# Patient Record
Sex: MALE | Race: ASIAN | NOT HISPANIC OR LATINO | Employment: OTHER | URBAN - METROPOLITAN AREA
[De-identification: names, ages, dates, MRNs, and addresses within clinical notes are randomized per-mention and may not be internally consistent; named-entity substitution may affect disease eponyms.]

---

## 2017-01-23 ENCOUNTER — ANTI-COAG VISIT (OUTPATIENT)
Dept: CARDIOLOGY | Facility: CLINIC | Age: 62
End: 2017-01-23
Payer: MEDICAID

## 2017-01-23 DIAGNOSIS — Z79.01 LONG TERM (CURRENT) USE OF ANTICOAGULANTS: Primary | ICD-10-CM

## 2017-01-23 LAB — INR PPP: 2.6 (ref 2–3)

## 2017-01-23 PROCEDURE — 99211 OFF/OP EST MAY X REQ PHY/QHP: CPT | Mod: PBBFAC

## 2017-01-23 PROCEDURE — 85610 PROTHROMBIN TIME: CPT | Mod: PBBFAC

## 2017-01-23 PROCEDURE — 99999 PR PBB SHADOW E&M-EST. PATIENT-LVL I: CPT | Mod: PBBFAC,,,

## 2017-01-23 NOTE — PROGRESS NOTES
INR remains therapeutic.  Patient denies changes to diet, medications, or health that would affect INR.  Patient will continue weekly warfarin regimen at this time.  His INR level is appropriate for his long flight tomorrow.  I reminded him to ambulate every 1 to 2 hours and to wear unrestrictive clothing.  He verbalized understanding.  Patient advised to contact clinic with any changes, questions, or concerns.

## 2017-04-18 ENCOUNTER — ANTI-COAG VISIT (OUTPATIENT)
Dept: CARDIOLOGY | Facility: CLINIC | Age: 62
End: 2017-04-18
Payer: MEDICAID

## 2017-04-18 DIAGNOSIS — Z79.01 LONG TERM (CURRENT) USE OF ANTICOAGULANTS: Primary | ICD-10-CM

## 2017-04-18 LAB — INR PPP: 2.1 (ref 2–3)

## 2017-04-18 PROCEDURE — 85610 PROTHROMBIN TIME: CPT | Mod: PBBFAC | Performed by: PHARMACIST

## 2017-05-16 DIAGNOSIS — Z79.01 LONG TERM CURRENT USE OF ANTICOAGULANT THERAPY: ICD-10-CM

## 2017-05-16 DIAGNOSIS — I82.409 DVT (DEEP VENOUS THROMBOSIS): ICD-10-CM

## 2017-05-16 RX ORDER — WARFARIN SODIUM 5 MG/1
TABLET ORAL
Qty: 90 TABLET | Refills: 0 | Status: SHIPPED | OUTPATIENT
Start: 2017-05-16 | End: 2017-12-06 | Stop reason: SDUPTHER

## 2017-06-13 ENCOUNTER — ANTI-COAG VISIT (OUTPATIENT)
Dept: CARDIOLOGY | Facility: CLINIC | Age: 62
End: 2017-06-13
Payer: MEDICAID

## 2017-06-13 DIAGNOSIS — Z79.01 LONG TERM (CURRENT) USE OF ANTICOAGULANTS: ICD-10-CM

## 2017-06-13 LAB — INR PPP: 3 (ref 2–3)

## 2017-06-13 PROCEDURE — 99999 PR PBB SHADOW E&M-EST. PATIENT-LVL I: CPT | Mod: PBBFAC,,,

## 2017-06-13 PROCEDURE — 99211 OFF/OP EST MAY X REQ PHY/QHP: CPT | Mod: PBBFAC

## 2017-06-13 PROCEDURE — 85610 PROTHROMBIN TIME: CPT | Mod: PBBFAC

## 2017-06-13 NOTE — PROGRESS NOTES
INR is good. Patient reports using allegra for allergies lately. No other changes reported. No signs or symptoms of bleeding. We will continue on maintenance dose. Repeat INR in another 8 weeks. Patient reminded he needs to see [facility] PCP for annual exam. Patient reports he will call to make appointment.

## 2017-07-28 DIAGNOSIS — Z12.11 COLON CANCER SCREENING: ICD-10-CM

## 2017-08-08 ENCOUNTER — ANTI-COAG VISIT (OUTPATIENT)
Dept: CARDIOLOGY | Facility: CLINIC | Age: 62
End: 2017-08-08
Payer: MEDICAID

## 2017-08-08 DIAGNOSIS — Z79.01 LONG TERM (CURRENT) USE OF ANTICOAGULANTS: ICD-10-CM

## 2017-08-08 LAB — INR PPP: 1.7 (ref 2–3)

## 2017-08-08 PROCEDURE — 85610 PROTHROMBIN TIME: CPT | Mod: PBBFAC

## 2017-08-08 NOTE — PROGRESS NOTES
Patient confirms dose. States 1 missed dose 8/2. No new medications or changes in diet. Will boost. I have reviewed the student's initial findings and agree with their assessment. I have personally spoken with and assessed the patient in clinic to devise care plan.

## 2017-10-04 ENCOUNTER — ANTI-COAG VISIT (OUTPATIENT)
Dept: CARDIOLOGY | Facility: CLINIC | Age: 62
End: 2017-10-04
Payer: MEDICAID

## 2017-10-04 DIAGNOSIS — Z79.01 LONG-TERM (CURRENT) USE OF ANTICOAGULANTS: Primary | ICD-10-CM

## 2017-10-04 LAB — INR PPP: 2 (ref 2–3)

## 2017-10-04 PROCEDURE — 85610 PROTHROMBIN TIME: CPT | Mod: PBBFAC

## 2017-10-04 NOTE — PROGRESS NOTES
Patient reports no changes to alter INR. Will maintain dose.  Advised to call [facility]in Clinic with any issues.  I have reviewed the student's initial findings and agree with their assessment. I have personally spoken with and assessed the patient in clinic to devise care plan.

## 2017-10-16 ENCOUNTER — TELEPHONE (OUTPATIENT)
Dept: INTERNAL MEDICINE | Facility: CLINIC | Age: 62
End: 2017-10-16

## 2017-10-16 NOTE — TELEPHONE ENCOUNTER
----- Message from Muna Banegas sent at 10/16/2017  3:22 PM CDT -----  Contact: self  Pt called in about wanting to schedule appt. Pt wants a physical.Pt would like the nurse to give him a call back in regards to this matter        Pt can be reached at [phone]      Thank You

## 2017-12-06 ENCOUNTER — ANTI-COAG VISIT (OUTPATIENT)
Dept: CARDIOLOGY | Facility: CLINIC | Age: 62
End: 2017-12-06
Payer: MEDICAID

## 2017-12-06 DIAGNOSIS — Z79.01 LONG-TERM (CURRENT) USE OF ANTICOAGULANTS: ICD-10-CM

## 2017-12-06 DIAGNOSIS — Z79.01 LONG TERM CURRENT USE OF ANTICOAGULANT THERAPY: Primary | ICD-10-CM

## 2017-12-06 LAB — INR PPP: 1.9 (ref 2–3)

## 2017-12-06 PROCEDURE — 85610 PROTHROMBIN TIME: CPT | Mod: PBBFAC

## 2017-12-06 PROCEDURE — 99999 PR PBB SHADOW E&M-EST. PATIENT-LVL I: CPT | Mod: PBBFAC,,,

## 2017-12-06 PROCEDURE — 99211 OFF/OP EST MAY X REQ PHY/QHP: CPT | Mod: PBBFAC

## 2017-12-06 RX ORDER — WARFARIN SODIUM 5 MG/1
TABLET ORAL
Qty: 56 TABLET | Refills: 3 | Status: SHIPPED | OUTPATIENT
Start: 2017-12-06 | End: 2018-03-20 | Stop reason: SDUPTHER

## 2017-12-06 NOTE — PROGRESS NOTES
INR slightly low today. Patient states that he drank an increased amount of green tea this past week and will not be continuing that. With this change will boost dose today and re challenge previously stable weekly dose until follow-up in 6 weeks. Advised him to call with any changes or concerns.

## 2017-12-06 NOTE — PROGRESS NOTES
Pt seen by Allie HANLEY. I have reviewed her initial findings and agree with her assessment and plan.

## 2017-12-20 ENCOUNTER — TELEPHONE (OUTPATIENT)
Dept: INTERNAL MEDICINE | Facility: CLINIC | Age: 62
End: 2017-12-20

## 2017-12-20 ENCOUNTER — NURSE TRIAGE (OUTPATIENT)
Dept: ADMINISTRATIVE | Facility: CLINIC | Age: 62
End: 2017-12-20

## 2017-12-20 NOTE — TELEPHONE ENCOUNTER
Reason for Disposition   Abdominal pain is a chronic symptom (recurrent or ongoing AND lasting > 4 weeks)    Protocols used: ST ABDOMINAL PAIN - MALE-A-[place]    Pt states he called Dr Queen office last month for apt with Dr Queen and no one was able to help him. Pt states he has abd pain with no other symptoms. Pt transferred to me by someone in Dr Queen's office. Pt states he only is calling for apt. I informed pt that I do not see any apts available next week but would send a message for someone in the office to call him back. Pt got upset stating that he already spoke to someone in office and was sent to me. Attempted to transfer pt back to Dr Queen's office per pt request. Pt got upset and states he will find a new doctor.

## 2017-12-20 NOTE — TELEPHONE ENCOUNTER
----- Message from Ayad Rueda sent at 12/20/2017 12:30 PM CST -----  Contact: Pt [phone]  Pt is requesting an appt. asap due to stomach pain and blood clot in left leg. Would like a call today.    Pt can be reached at [phone]    Thanks

## 2018-01-17 NOTE — PROGRESS NOTES
Patient called to reschedule today's coumadin clinic appointment due to the bad weather, appointment was rescheduled to Friday -1/19

## 2018-01-19 ENCOUNTER — ANTI-COAG VISIT (OUTPATIENT)
Dept: CARDIOLOGY | Facility: CLINIC | Age: 63
End: 2018-01-19
Payer: MEDICAID

## 2018-01-19 DIAGNOSIS — Z79.01 LONG TERM (CURRENT) USE OF ANTICOAGULANTS: Primary | ICD-10-CM

## 2018-01-19 LAB — INR PPP: 2.4 (ref 2–3)

## 2018-01-19 PROCEDURE — 85610 PROTHROMBIN TIME: CPT | Mod: PBBFAC

## 2018-01-19 PROCEDURE — 99211 OFF/OP EST MAY X REQ PHY/QHP: CPT | Mod: S$PBB,25,,

## 2018-01-19 NOTE — PROGRESS NOTES
INR within normal range today. Patient states he has started a new medicine, atorvastatin, no DDI expected. Denies any bleeding, bruising or other changes. Patient is stable on this dose. He will continue this dose until follow-up in 7 weeks. I advised him to contact us with any changes or problems.

## 2018-03-20 ENCOUNTER — ANTI-COAG VISIT (OUTPATIENT)
Dept: CARDIOLOGY | Facility: CLINIC | Age: 63
End: 2018-03-20
Payer: MEDICAID

## 2018-03-20 DIAGNOSIS — Z79.01 LONG TERM CURRENT USE OF ANTICOAGULANT THERAPY: ICD-10-CM

## 2018-03-20 DIAGNOSIS — Z79.01 LONG TERM (CURRENT) USE OF ANTICOAGULANTS: Primary | ICD-10-CM

## 2018-03-20 LAB — INR PPP: 2.9 (ref 2–3)

## 2018-03-20 PROCEDURE — 85610 PROTHROMBIN TIME: CPT | Mod: PBBFAC

## 2018-03-20 RX ORDER — WARFARIN SODIUM 5 MG/1
TABLET ORAL
Qty: 60 TABLET | Refills: 3 | Status: SHIPPED | OUTPATIENT
Start: 2018-03-20 | End: 2019-06-04 | Stop reason: SDUPTHER

## 2018-05-01 ENCOUNTER — ANTI-COAG VISIT (OUTPATIENT)
Dept: CARDIOLOGY | Facility: CLINIC | Age: 63
End: 2018-05-01
Payer: MEDICAID

## 2018-05-01 DIAGNOSIS — Z79.01 LONG TERM (CURRENT) USE OF ANTICOAGULANTS: Primary | ICD-10-CM

## 2018-05-01 LAB — INR PPP: 2.5 (ref 2–3)

## 2018-05-01 PROCEDURE — 85610 PROTHROMBIN TIME: CPT | Mod: PBBFAC

## 2018-05-01 NOTE — PROGRESS NOTES
Patient seen by Angella HANLEY. I have reviewed her initial findings and agree with her assessment.  Care plan made together.

## 2018-06-12 ENCOUNTER — ANTI-COAG VISIT (OUTPATIENT)
Dept: CARDIOLOGY | Facility: CLINIC | Age: 63
End: 2018-06-12
Payer: MEDICAID

## 2018-06-12 DIAGNOSIS — Z79.01 LONG TERM (CURRENT) USE OF ANTICOAGULANTS: Primary | ICD-10-CM

## 2018-06-12 LAB — INR PPP: 2.4 (ref 2–3)

## 2018-06-12 PROCEDURE — 85610 PROTHROMBIN TIME: CPT | Mod: PBBFAC

## 2018-08-21 ENCOUNTER — ANTI-COAG VISIT (OUTPATIENT)
Dept: CARDIOLOGY | Facility: CLINIC | Age: 63
End: 2018-08-21
Payer: MEDICAID

## 2018-08-21 DIAGNOSIS — Z79.01 LONG TERM (CURRENT) USE OF ANTICOAGULANTS: Primary | ICD-10-CM

## 2018-08-21 LAB — INR PPP: 3.5 (ref 2–3)

## 2018-08-21 PROCEDURE — 85610 PROTHROMBIN TIME: CPT | Mod: PBBFAC

## 2018-08-24 NOTE — PROGRESS NOTES
INR elevated today.  Patient does report missing dose on 8/14 as well as stress of driving a long way to go on vacation.  Will hold today then resume with follow up in 2 weeks.  Instructed patient on importance of taking Coumadin as prescribed as not miss a dose which he verbalized understanding.  Bruising from use but denies bleeding or other issues.  KING Wagner D assisted with dosing

## 2018-09-07 ENCOUNTER — ANTI-COAG VISIT (OUTPATIENT)
Dept: CARDIOLOGY | Facility: CLINIC | Age: 63
End: 2018-09-07
Payer: MEDICAID

## 2018-09-07 DIAGNOSIS — Z79.01 LONG TERM (CURRENT) USE OF ANTICOAGULANTS: Primary | ICD-10-CM

## 2018-09-07 LAB — INR PPP: 3.3 (ref 2–3)

## 2018-09-07 PROCEDURE — 85610 PROTHROMBIN TIME: CPT | Mod: PBBFAC

## 2018-09-07 NOTE — PROGRESS NOTES
Quick follow up for elevated INR on 8/21. INR elevated today but improving. Patient denies any bleeding, bruising or other changes. Patient requested his we resume his dose of 2.5mg daily. We will decrease his current dose. Patient states that he can not come back for 6 weeks. Patient reminded of the importance of frequent monitoring of INR when out of range. He states that he understands.  Patient reminded to call clinic with any changes or concerns. Care plan made with M Colosino Pharm D.

## 2018-10-18 ENCOUNTER — ANTI-COAG VISIT (OUTPATIENT)
Dept: CARDIOLOGY | Facility: CLINIC | Age: 63
End: 2018-10-18
Payer: MEDICAID

## 2018-10-18 DIAGNOSIS — Z79.01 LONG TERM (CURRENT) USE OF ANTICOAGULANTS: Primary | ICD-10-CM

## 2018-10-18 LAB — INR PPP: 2.9 (ref 2–3)

## 2018-10-18 PROCEDURE — 85610 PROTHROMBIN TIME: CPT | Mod: PBBFAC

## 2018-10-18 PROCEDURE — 99211 OFF/OP EST MAY X REQ PHY/QHP: CPT | Mod: S$PBB,25,,

## 2018-10-18 NOTE — PROGRESS NOTES
INR within therapeutic range today. Bruising noted to arms from use. Patient denies any bleeding or other changes that would affect warfarin therapy. Will maintain current dose and follow up in 6 weeks . Patient advised to call coumadin clinic with any changes or concerns. Care plan made with NIKKI OAKES

## 2018-12-06 ENCOUNTER — ANTI-COAG VISIT (OUTPATIENT)
Dept: CARDIOLOGY | Facility: CLINIC | Age: 63
End: 2018-12-06
Payer: MEDICAID

## 2018-12-06 DIAGNOSIS — Z79.01 LONG TERM (CURRENT) USE OF ANTICOAGULANTS: Primary | ICD-10-CM

## 2018-12-06 LAB — INR PPP: 1.5 (ref 2–3)

## 2018-12-06 PROCEDURE — 85610 PROTHROMBIN TIME: CPT | Mod: PBBFAC

## 2018-12-06 NOTE — PROGRESS NOTES
INR subtherapeutic today. He states he may have had a little extra greens. Patient denies any bleeding or changes that may affect warfarin therapy. We will boost his dose X 2 days then resume. Follow up in 3 weeks. Patient reminded to call clinic with any changes or concerns. Care plan made with NIKKI OAKES

## 2019-01-09 ENCOUNTER — ANTI-COAG VISIT (OUTPATIENT)
Dept: CARDIOLOGY | Facility: CLINIC | Age: 64
End: 2019-01-09
Payer: MEDICAID

## 2019-01-09 DIAGNOSIS — Z79.01 LONG TERM (CURRENT) USE OF ANTICOAGULANTS: Primary | ICD-10-CM

## 2019-01-09 LAB — INR PPP: 1.9 (ref 2–3)

## 2019-01-09 PROCEDURE — 85610 PROTHROMBIN TIME: CPT | Mod: PBBFAC

## 2019-01-09 NOTE — PROGRESS NOTES
Quick follow up for subtherapeutic INR on  11/6. INR subtherapeutic today. Patient states he did have extra greens. He denies any bleeding, bruising or other changes. We will boost his dose today then resume. [place] and unable to have a PT/INR checked until 3/7/19. Patient advised to call clinic with any changes or concerns Care plan made with NIKKI PAGE

## 2019-03-19 ENCOUNTER — ANTI-COAG VISIT (OUTPATIENT)
Dept: CARDIOLOGY | Facility: CLINIC | Age: 64
End: 2019-03-19
Payer: MEDICAID

## 2019-03-19 DIAGNOSIS — Z79.01 LONG TERM (CURRENT) USE OF ANTICOAGULANTS: Primary | ICD-10-CM

## 2019-03-19 LAB — INR PPP: 3 (ref 2–3)

## 2019-03-19 PROCEDURE — 85610 PROTHROMBIN TIME: CPT | Mod: PBBFAC

## 2019-03-19 NOTE — PROGRESS NOTES
Pt seen by Aurora HANLEY. I have reviewed her initial findings and agree with her assessment and plan

## 2019-03-19 NOTE — PROGRESS NOTES
Quick follow up for subtherapeutic INR on 1/9. INR within therapeutic range today. Bruising noted to arms from use. Patient denies any bleeding or other changes that would affect warfarin therapy. Will maintain current dose and follow up in 8 weeks. Patient was re-educated on situations that would require placing a call to the Coumadin  Clinic, including bleeding or unusual bruising issues, changes in health, diet or medications,upcoming procedures that require warfarin interruption, and missed Coumadin dose(s). Patient expressed understanding that avoidance of consistency with these parameters could cause fluctuations in INR, leading to more frequent visits and increase risk of adverse events. Care plan made with NIKKI OAKES

## 2019-06-04 ENCOUNTER — ANTI-COAG VISIT (OUTPATIENT)
Dept: CARDIOLOGY | Facility: CLINIC | Age: 64
End: 2019-06-04
Payer: MEDICAID

## 2019-06-04 DIAGNOSIS — Z79.01 LONG TERM CURRENT USE OF ANTICOAGULANT THERAPY: ICD-10-CM

## 2019-06-04 DIAGNOSIS — Z79.01 LONG TERM (CURRENT) USE OF ANTICOAGULANTS: Primary | ICD-10-CM

## 2019-06-04 LAB — INR PPP: 2.2 (ref 2–3)

## 2019-06-04 PROCEDURE — 85610 PROTHROMBIN TIME: CPT | Mod: PBBFAC

## 2019-06-04 RX ORDER — WARFARIN SODIUM 5 MG/1
TABLET ORAL
Qty: 60 TABLET | Refills: 3 | Status: SHIPPED | OUTPATIENT
Start: 2019-06-04 | End: 2019-09-30 | Stop reason: SDUPTHER

## 2019-06-04 RX ORDER — WARFARIN SODIUM 5 MG/1
TABLET ORAL
Qty: 60 TABLET | Refills: 3 | Status: SHIPPED | OUTPATIENT
Start: 2019-06-04 | End: 2019-06-04

## 2019-08-28 NOTE — PROGRESS NOTES
Spoke to Ms. Ramírez and she stated that the pt is out of town and will be back on 8/30/19. Advised her I will call the patient net week to get him rescheduled for his missed appt.

## 2019-09-16 NOTE — PROGRESS NOTES
"Unable to reach patient. Phone call states, "Sorry, your call cannot be completed at this time". Will send out missed letter today.   "

## 2019-09-30 ENCOUNTER — ANTI-COAG VISIT (OUTPATIENT)
Dept: CARDIOLOGY | Facility: CLINIC | Age: 64
End: 2019-09-30
Payer: MEDICAID

## 2019-09-30 DIAGNOSIS — Z79.01 LONG TERM CURRENT USE OF ANTICOAGULANT THERAPY: ICD-10-CM

## 2019-09-30 DIAGNOSIS — I82.5Y2: ICD-10-CM

## 2019-09-30 DIAGNOSIS — Z79.01 LONG TERM (CURRENT) USE OF ANTICOAGULANTS: Primary | ICD-10-CM

## 2019-09-30 LAB — INR PPP: 2.3 (ref 2–3)

## 2019-09-30 PROCEDURE — 93793 ANTICOAG MGMT PT WARFARIN: CPT | Mod: ,,,

## 2019-09-30 PROCEDURE — 85610 PROTHROMBIN TIME: CPT | Mod: PBBFAC

## 2019-09-30 PROCEDURE — 93793 PR ANTICOAGULANT MGMT FOR PT TAKING WARFARIN: ICD-10-PCS | Mod: ,,,

## 2019-09-30 RX ORDER — WARFARIN SODIUM 5 MG/1
TABLET ORAL
Qty: 30 TABLET | Refills: 1 | Status: SHIPPED | OUTPATIENT
Start: 2019-09-30 | End: 2019-11-12 | Stop reason: SDUPTHER

## 2019-09-30 NOTE — PROGRESS NOTES
INR at goal. Medications and chart reviewed. No changes noted to necessitate adjustment of warfarin or follow-up plan. See calendar.  Pt denies any changes.  Maintain current regimen & re-assess in 6 weeks. New prescription sent to preferred pharmacy.   Patient was re-educated on situations that would require placing a call to the Coumadin Clinic, including bleeding or unusual bruising issues, changes in health, diet or medications,upcoming procedures that require warfarin interruption, and missed Coumadin dose(s). Patient expressed understanding that avoidance of consistency with these parameters could cause fluctuations in INR, leading to more frequent visits and increase risk of adverse events.

## 2019-11-12 ENCOUNTER — ANTI-COAG VISIT (OUTPATIENT)
Dept: CARDIOLOGY | Facility: CLINIC | Age: 64
End: 2019-11-12
Payer: MEDICAID

## 2019-11-12 DIAGNOSIS — Z79.01 LONG TERM (CURRENT) USE OF ANTICOAGULANTS: Primary | ICD-10-CM

## 2019-11-12 DIAGNOSIS — I82.5Y2: ICD-10-CM

## 2019-11-12 DIAGNOSIS — Z79.01 LONG TERM CURRENT USE OF ANTICOAGULANT THERAPY: ICD-10-CM

## 2019-11-12 LAB — INR PPP: 2.3 (ref 2–3)

## 2019-11-12 PROCEDURE — 93793 ANTICOAG MGMT PT WARFARIN: CPT | Mod: ,,,

## 2019-11-12 PROCEDURE — 93793 PR ANTICOAGULANT MGMT FOR PT TAKING WARFARIN: ICD-10-PCS | Mod: ,,,

## 2019-11-12 PROCEDURE — 85610 PROTHROMBIN TIME: CPT | Mod: PBBFAC

## 2019-11-12 RX ORDER — WARFARIN SODIUM 5 MG/1
TABLET ORAL
Qty: 30 TABLET | Refills: 2 | Status: SHIPPED | OUTPATIENT
Start: 2019-11-12 | End: 2021-05-13 | Stop reason: ALTCHOICE

## 2019-11-12 NOTE — PROGRESS NOTES
INR at goal. Medications and chart reviewed. No changes noted to necessitate adjustment of warfarin or follow-up plan. See calendar.  Pt denies any changes.  Will maintain current regimen & re-assess in 8 weeks.  Rx sent to preferred pharmacy.   Patient was re-educated on situations that would require placing a call to the Coumadin Clinic, including bleeding or unusual bruising issues, changes in health, diet or medications,upcoming procedures that require warfarin interruption, and missed Coumadin dose(s). Patient expressed understanding that avoidance of consistency with these parameters could cause fluctuations in INR, leading to more frequent visits and increase risk of adverse events.

## 2020-01-14 ENCOUNTER — ANTI-COAG VISIT (OUTPATIENT)
Dept: CARDIOLOGY | Facility: CLINIC | Age: 65
End: 2020-01-14
Payer: MEDICAID

## 2020-01-14 DIAGNOSIS — Z79.01 LONG TERM (CURRENT) USE OF ANTICOAGULANTS: Primary | ICD-10-CM

## 2020-01-14 LAB — INR PPP: 2.7 (ref 2–3)

## 2020-01-14 PROCEDURE — 85610 PROTHROMBIN TIME: CPT | Mod: PBBFAC

## 2020-01-14 PROCEDURE — 93793 ANTICOAG MGMT PT WARFARIN: CPT | Mod: ,,, | Performed by: PHARMACIST

## 2020-01-14 PROCEDURE — 93793 PR ANTICOAGULANT MGMT FOR PT TAKING WARFARIN: ICD-10-PCS | Mod: ,,, | Performed by: PHARMACIST

## 2020-01-14 NOTE — PROGRESS NOTES
INR at goal. Reports no changes in diet, medications, or health that would affect his INR. Plan to continue dose per calendar and follow up in 8 weeks. Patient was re-educated on situations that would require placing a call to the coumadin clinic, including bleeding or unusual bruising issues, changes in health, diet or medications, upcoming procedures that require warfarin interruption, and missed coumadin dose(s). Patient expressed understanding that avoidance of consistency with these parameters could cause fluctuations in INR, leading to more frequent visits and increase risk of adverse events.

## 2020-04-10 NOTE — PROGRESS NOTES
4/10 - last scheduled INR missed. Levels stable but now almost 12 weeks since last drawn (longest recommended interval per CHEST). Recommend repeat INR asap.

## 2020-05-28 ENCOUNTER — LAB VISIT (OUTPATIENT)
Dept: LAB | Facility: HOSPITAL | Age: 65
End: 2020-05-28
Attending: INTERNAL MEDICINE
Payer: MEDICAID

## 2020-05-28 DIAGNOSIS — Z79.01 LONG TERM (CURRENT) USE OF ANTICOAGULANTS: ICD-10-CM

## 2020-05-28 LAB
INR PPP: 2.8 (ref 0.8–1.2)
PROTHROMBIN TIME: 26.3 SEC (ref 9–12.5)

## 2020-05-28 PROCEDURE — 36415 COLL VENOUS BLD VENIPUNCTURE: CPT | Mod: PO

## 2020-05-28 PROCEDURE — 85610 PROTHROMBIN TIME: CPT

## 2020-05-29 ENCOUNTER — ANTI-COAG VISIT (OUTPATIENT)
Dept: CARDIOLOGY | Facility: CLINIC | Age: 65
End: 2020-05-29
Payer: MEDICAID

## 2020-05-29 DIAGNOSIS — Z79.01 LONG TERM (CURRENT) USE OF ANTICOAGULANTS: ICD-10-CM

## 2020-05-29 PROCEDURE — 93793 ANTICOAG MGMT PT WARFARIN: CPT | Mod: ,,, | Performed by: PHARMACIST

## 2020-05-29 PROCEDURE — 93793 PR ANTICOAGULANT MGMT FOR PT TAKING WARFARIN: ICD-10-PCS | Mod: ,,, | Performed by: PHARMACIST

## 2021-05-11 ENCOUNTER — LAB VISIT (OUTPATIENT)
Dept: LAB | Facility: HOSPITAL | Age: 66
End: 2021-05-11
Attending: INTERNAL MEDICINE
Payer: MEDICAID

## 2021-05-11 DIAGNOSIS — Z79.01 LONG TERM (CURRENT) USE OF ANTICOAGULANTS: ICD-10-CM

## 2021-05-11 LAB
INR PPP: 1.7 (ref 0.8–1.2)
PROTHROMBIN TIME: 17.9 SEC (ref 9–12.5)

## 2021-05-11 PROCEDURE — 36415 COLL VENOUS BLD VENIPUNCTURE: CPT | Mod: PO | Performed by: INTERNAL MEDICINE

## 2021-05-11 PROCEDURE — 85610 PROTHROMBIN TIME: CPT | Performed by: INTERNAL MEDICINE

## 2021-05-12 ENCOUNTER — ANTI-COAG VISIT (OUTPATIENT)
Dept: CARDIOLOGY | Facility: CLINIC | Age: 66
End: 2021-05-12
Payer: MEDICAID

## 2021-05-12 DIAGNOSIS — Z79.01 LONG TERM (CURRENT) USE OF ANTICOAGULANTS: Primary | ICD-10-CM

## 2021-05-12 PROCEDURE — 93793 PR ANTICOAGULANT MGMT FOR PT TAKING WARFARIN: ICD-10-PCS | Mod: ,,, | Performed by: PHARMACIST

## 2021-05-12 PROCEDURE — 93793 ANTICOAG MGMT PT WARFARIN: CPT | Mod: ,,, | Performed by: PHARMACIST

## 2021-05-13 ENCOUNTER — OFFICE VISIT (OUTPATIENT)
Dept: PRIMARY CARE CLINIC | Facility: CLINIC | Age: 66
End: 2021-05-13
Payer: MEDICAID

## 2021-05-13 ENCOUNTER — LAB VISIT (OUTPATIENT)
Dept: PRIMARY CARE CLINIC | Facility: CLINIC | Age: 66
End: 2021-05-13
Payer: MEDICAID

## 2021-05-13 VITALS
BODY MASS INDEX: 22.77 KG/M2 | HEIGHT: 68 IN | WEIGHT: 150.25 LBS | SYSTOLIC BLOOD PRESSURE: 126 MMHG | OXYGEN SATURATION: 98 % | DIASTOLIC BLOOD PRESSURE: 82 MMHG | HEART RATE: 61 BPM

## 2021-05-13 DIAGNOSIS — E78.5 HYPERLIPIDEMIA, UNSPECIFIED HYPERLIPIDEMIA TYPE: ICD-10-CM

## 2021-05-13 DIAGNOSIS — Z86.718 HISTORY OF DVT (DEEP VEIN THROMBOSIS): ICD-10-CM

## 2021-05-13 DIAGNOSIS — R74.01 TRANSAMINITIS: ICD-10-CM

## 2021-05-13 DIAGNOSIS — I10 ESSENTIAL HYPERTENSION: ICD-10-CM

## 2021-05-13 DIAGNOSIS — Z00.00 ANNUAL PHYSICAL EXAM: ICD-10-CM

## 2021-05-13 DIAGNOSIS — Z00.00 ANNUAL PHYSICAL EXAM: Primary | ICD-10-CM

## 2021-05-13 LAB
ALBUMIN SERPL BCP-MCNC: 4.1 G/DL (ref 3.5–5.2)
ALP SERPL-CCNC: 74 U/L (ref 55–135)
ALT SERPL W/O P-5'-P-CCNC: 50 U/L (ref 10–44)
ANION GAP SERPL CALC-SCNC: 8 MMOL/L (ref 8–16)
AST SERPL-CCNC: 29 U/L (ref 10–40)
BASOPHILS # BLD AUTO: 0.06 K/UL (ref 0–0.2)
BASOPHILS NFR BLD: 0.8 % (ref 0–1.9)
BILIRUB SERPL-MCNC: 0.8 MG/DL (ref 0.1–1)
BUN SERPL-MCNC: 13 MG/DL (ref 8–23)
CALCIUM SERPL-MCNC: 8.8 MG/DL (ref 8.7–10.5)
CHLORIDE SERPL-SCNC: 105 MMOL/L (ref 95–110)
CHOLEST SERPL-MCNC: 144 MG/DL (ref 120–199)
CHOLEST/HDLC SERPL: 3.5 {RATIO} (ref 2–5)
CO2 SERPL-SCNC: 28 MMOL/L (ref 23–29)
CREAT SERPL-MCNC: 1 MG/DL (ref 0.5–1.4)
DIFFERENTIAL METHOD: NORMAL
EOSINOPHIL # BLD AUTO: 0.3 K/UL (ref 0–0.5)
EOSINOPHIL NFR BLD: 3.9 % (ref 0–8)
ERYTHROCYTE [DISTWIDTH] IN BLOOD BY AUTOMATED COUNT: 12.7 % (ref 11.5–14.5)
EST. GFR  (AFRICAN AMERICAN): >60 ML/MIN/1.73 M^2
EST. GFR  (NON AFRICAN AMERICAN): >60 ML/MIN/1.73 M^2
ESTIMATED AVG GLUCOSE: 117 MG/DL (ref 68–131)
GLUCOSE SERPL-MCNC: 91 MG/DL (ref 70–110)
HBA1C MFR BLD: 5.7 % (ref 4–5.6)
HCT VFR BLD AUTO: 47.2 % (ref 40–54)
HDLC SERPL-MCNC: 41 MG/DL (ref 40–75)
HDLC SERPL: 28.5 % (ref 20–50)
HGB BLD-MCNC: 15.6 G/DL (ref 14–18)
IMM GRANULOCYTES # BLD AUTO: 0.03 K/UL (ref 0–0.04)
IMM GRANULOCYTES NFR BLD AUTO: 0.4 % (ref 0–0.5)
LDLC SERPL CALC-MCNC: 67.8 MG/DL (ref 63–159)
LYMPHOCYTES # BLD AUTO: 2.3 K/UL (ref 1–4.8)
LYMPHOCYTES NFR BLD: 32.4 % (ref 18–48)
MCH RBC QN AUTO: 29.6 PG (ref 27–31)
MCHC RBC AUTO-ENTMCNC: 33.1 G/DL (ref 32–36)
MCV RBC AUTO: 90 FL (ref 82–98)
MONOCYTES # BLD AUTO: 0.5 K/UL (ref 0.3–1)
MONOCYTES NFR BLD: 6.2 % (ref 4–15)
NEUTROPHILS # BLD AUTO: 4.1 K/UL (ref 1.8–7.7)
NEUTROPHILS NFR BLD: 56.3 % (ref 38–73)
NONHDLC SERPL-MCNC: 103 MG/DL
NRBC BLD-RTO: 0 /100 WBC
PLATELET # BLD AUTO: 250 K/UL (ref 150–450)
PMV BLD AUTO: 10.6 FL (ref 9.2–12.9)
POTASSIUM SERPL-SCNC: 4.2 MMOL/L (ref 3.5–5.1)
PROT SERPL-MCNC: 7.3 G/DL (ref 6–8.4)
RBC # BLD AUTO: 5.27 M/UL (ref 4.6–6.2)
SODIUM SERPL-SCNC: 141 MMOL/L (ref 136–145)
TRIGL SERPL-MCNC: 176 MG/DL (ref 30–150)
WBC # BLD AUTO: 7.23 K/UL (ref 3.9–12.7)

## 2021-05-13 PROCEDURE — 99204 OFFICE O/P NEW MOD 45 MIN: CPT | Mod: S$PBB,,, | Performed by: FAMILY MEDICINE

## 2021-05-13 PROCEDURE — 99214 OFFICE O/P EST MOD 30 MIN: CPT | Mod: PBBFAC,PN | Performed by: FAMILY MEDICINE

## 2021-05-13 PROCEDURE — 99204 PR OFFICE/OUTPT VISIT, NEW, LEVL IV, 45-59 MIN: ICD-10-PCS | Mod: S$PBB,,, | Performed by: FAMILY MEDICINE

## 2021-05-13 PROCEDURE — 99999 PR PBB SHADOW E&M-EST. PATIENT-LVL IV: CPT | Mod: PBBFAC,,, | Performed by: FAMILY MEDICINE

## 2021-05-13 PROCEDURE — 36415 COLL VENOUS BLD VENIPUNCTURE: CPT | Performed by: FAMILY MEDICINE

## 2021-05-13 PROCEDURE — 36415 COLL VENOUS BLD VENIPUNCTURE: CPT | Mod: PBBFAC,PN | Performed by: FAMILY MEDICINE

## 2021-05-13 PROCEDURE — 99999 PR PBB SHADOW E&M-EST. PATIENT-LVL IV: ICD-10-PCS | Mod: PBBFAC,,, | Performed by: FAMILY MEDICINE

## 2021-05-13 PROCEDURE — 80061 LIPID PANEL: CPT | Performed by: FAMILY MEDICINE

## 2021-05-13 PROCEDURE — 80053 COMPREHEN METABOLIC PANEL: CPT | Performed by: FAMILY MEDICINE

## 2021-05-13 PROCEDURE — 85025 COMPLETE CBC W/AUTO DIFF WBC: CPT | Performed by: FAMILY MEDICINE

## 2021-05-13 PROCEDURE — 83036 HEMOGLOBIN GLYCOSYLATED A1C: CPT | Performed by: FAMILY MEDICINE

## 2021-05-13 RX ORDER — TAMSULOSIN HYDROCHLORIDE 0.4 MG/1
CAPSULE ORAL
COMMUNITY
Start: 2021-03-15 | End: 2022-07-20 | Stop reason: SDUPTHER

## 2021-05-13 RX ORDER — LOSARTAN POTASSIUM 25 MG/1
TABLET ORAL
COMMUNITY
Start: 2021-03-15 | End: 2021-07-22

## 2021-05-13 RX ORDER — ATORVASTATIN CALCIUM 20 MG/1
TABLET, FILM COATED ORAL
COMMUNITY
Start: 2021-03-17 | End: 2021-07-22

## 2021-07-01 ENCOUNTER — LAB VISIT (OUTPATIENT)
Dept: PRIMARY CARE CLINIC | Facility: CLINIC | Age: 66
End: 2021-07-01
Payer: MEDICAID

## 2021-07-01 DIAGNOSIS — Z79.01 LONG TERM (CURRENT) USE OF ANTICOAGULANTS: ICD-10-CM

## 2021-07-01 PROCEDURE — 85610 PROTHROMBIN TIME: CPT | Performed by: INTERNAL MEDICINE

## 2021-07-01 PROCEDURE — 36415 COLL VENOUS BLD VENIPUNCTURE: CPT | Mod: PBBFAC,PN

## 2021-07-02 ENCOUNTER — ANTI-COAG VISIT (OUTPATIENT)
Dept: CARDIOLOGY | Facility: CLINIC | Age: 66
End: 2021-07-02

## 2021-07-02 DIAGNOSIS — Z79.01 LONG TERM (CURRENT) USE OF ANTICOAGULANTS: ICD-10-CM

## 2021-07-02 DIAGNOSIS — Z86.718 HISTORY OF DVT (DEEP VEIN THROMBOSIS): Primary | ICD-10-CM

## 2021-07-02 LAB
INR PPP: 1 (ref 0.8–1.2)
PROTHROMBIN TIME: 10.6 SEC (ref 9–12.5)

## 2021-09-10 DIAGNOSIS — Z86.718 HISTORY OF DVT (DEEP VEIN THROMBOSIS): ICD-10-CM

## 2021-12-17 DIAGNOSIS — Z86.718 HISTORY OF DVT (DEEP VEIN THROMBOSIS): ICD-10-CM

## 2022-01-19 RX ORDER — LEVOCETIRIZINE DIHYDROCHLORIDE 5 MG/1
5 TABLET, FILM COATED ORAL NIGHTLY
Qty: 90 TABLET | Refills: 3 | Status: SHIPPED | OUTPATIENT
Start: 2022-01-19 | End: 2022-09-28 | Stop reason: SDUPTHER

## 2022-01-19 NOTE — TELEPHONE ENCOUNTER
----- Message from Eri Rueda MA sent at 1/19/2022 11:16 AM CST -----  Contact: Pt [phone]    ----- Message -----  From: Tiny Nieves  Sent: 1/19/2022  10:54 AM CST  To: [facility] Staff    Patient is requesting an appointment for labs to check his f/u STAT PT/INR Coumadin Clinic Pt Do not Cancel or Reschedule-Pt to call coumadin clinic to cancel or reschedule    He has not had this test since 7/2021.    Please call and advise.    Thank You

## 2022-06-30 DIAGNOSIS — Z86.718 HISTORY OF DVT (DEEP VEIN THROMBOSIS): ICD-10-CM

## 2022-06-30 RX ORDER — LOSARTAN POTASSIUM 25 MG/1
25 TABLET ORAL DAILY
Qty: 90 TABLET | Refills: 3 | Status: SHIPPED | OUTPATIENT
Start: 2022-06-30 | End: 2023-06-12 | Stop reason: SDUPTHER

## 2022-06-30 RX ORDER — ATORVASTATIN CALCIUM 20 MG/1
TABLET, FILM COATED ORAL
Qty: 90 TABLET | Refills: 3 | Status: SHIPPED | OUTPATIENT
Start: 2022-06-30 | End: 2022-06-30 | Stop reason: SDUPTHER

## 2022-06-30 RX ORDER — LOSARTAN POTASSIUM 25 MG/1
25 TABLET ORAL DAILY
Qty: 90 TABLET | Refills: 3 | Status: SHIPPED | OUTPATIENT
Start: 2022-06-30 | End: 2022-06-30 | Stop reason: SDUPTHER

## 2022-06-30 RX ORDER — ATORVASTATIN CALCIUM 20 MG/1
TABLET, FILM COATED ORAL
Qty: 90 TABLET | Refills: 3 | Status: SHIPPED | OUTPATIENT
Start: 2022-06-30 | End: 2023-06-12 | Stop reason: SDUPTHER

## 2022-06-30 NOTE — TELEPHONE ENCOUNTER
----- Message from Alvina KUNAL Feldman sent at 6/30/2022 11:10 AM CDT -----  Contact: [phone]  Requesting an RX refill or new RX.  Is this a refill or new RX: refill  RX name and strength :apixaban (ELIQUIS) 2.5 mg Tab  Is this a 30 day or 90 day RX: 180 qty  Pharmacy name and phone #   [facility] [address]  [address] #[facility] [address]  Phone: [phone] Fax: [phone]    The doctors have asked that we provide their patients with the following 2 reminders -- prescription refills can take up to 72 hours, and a friendly reminder that in the future you can use your [facility] account to request refills: yes       Requesting an RX refill or new RX.  Is this a refill or new RX: refill  RX name and strength :atorvastatin (LIPITOR) 20 MG tablet  Is this a 30 day or 90 day RX: 90  Pharmacy name and phone #   [facility] [address]  [address] #[facility] [address]  Phone: [phone] Fax: [phone]     The doctors have asked that we provide their patients with the following 2 reminders -- prescription refills can take up to 72 hours, and a friendly reminder that in the future you can use your [facility] account to request refills: yes         Requesting an RX refill or new RX.  Is this a refill or new RX: refill  RX name and strength :losartan (COZAAR) 25 MG tablet  Is this a 30 day or 90 day RX: 90  Pharmacy name and phone #   [facility] - [place], [place] - [address]  [address] [facility] [address]  Phone: [phone] Fax: [phone]    The doctors have asked that we provide their patients with the following 2 reminders -- prescription refills can take up to 72 hours, and a friendly reminder that in the future you can use your [facility] account to request refills: yes

## 2022-07-05 DIAGNOSIS — Z86.718 HISTORY OF DVT (DEEP VEIN THROMBOSIS): ICD-10-CM

## 2022-07-05 NOTE — TELEPHONE ENCOUNTER
----- Message from Eloisa Kamara sent at 7/5/2022  3:20 PM CDT -----  Contact: [phone]  Requesting an RX refill or new RX.  Is this a refill or new RX: Refill 1  RX name and strength (copy/paste from chart): apixaban (ELIQUIS) 2.5 mg Tab   Is this a 30 day or 90 day RX:   Pharmacy name and phone # (copy/paste from chart):  [address] Phone:  [phone]  Fax:  [phone]      Requesting an RX refill or new RX.  Is this a refill or new RX: Refill 2  RX name and strength (copy/paste from chart): tamsulosin (FLOMAX) 0.4 mg Cap  Is this a 30 day or 90 day RX:   Pharmacy name and phone # (copy/paste from chart):  [address] Phone:  [phone]  Fax:  [phone]    Requesting an RX refill or new RX.  Is this a refill or new RX: Refill 3  RX name and strength (copy/paste from chart): losartan (COZAAR) 25 MG tablet  Is this a 30 day or 90 day RX:   Pharmacy name and phone # (copy/paste from chart):  [address] Phone:  [phone]  Fax:  [phone]    Requesting an RX refill or new RX.  Is this a refill or new RX: Refill 4  RX name and strength (copy/paste from chart):atorvastatin (LIPITOR) 20 MG tablet  Is this a 30 day or 90 day RX:   Pharmacy name and phone # (copy/paste from chart):  [address] Phone:  [phone]  Fax:  [phone]      The doctors have asked that we provide their patients with the following 2 reminders -- prescription refills can take up to 72 hours, and a friendly reminder that in the future you can use your [facility] account to request refills:       Patient stated that he called last week for his refill and today and is showing that it was cancel by PCP. Please advise. Thank you.

## 2022-07-06 RX ORDER — ATORVASTATIN CALCIUM 20 MG/1
TABLET, FILM COATED ORAL
Qty: 90 TABLET | Refills: 3 | OUTPATIENT
Start: 2022-07-06

## 2022-07-06 RX ORDER — LOSARTAN POTASSIUM 25 MG/1
25 TABLET ORAL DAILY
Qty: 90 TABLET | Refills: 3 | OUTPATIENT
Start: 2022-07-06

## 2022-07-06 RX ORDER — TAMSULOSIN HYDROCHLORIDE 0.4 MG/1
CAPSULE ORAL
OUTPATIENT
Start: 2022-07-06

## 2022-07-06 NOTE — TELEPHONE ENCOUNTER
Spoke to pt informed requested meds escribed 6/30 to [facility] pharmacy with receipt confirmation. Pt states he will call pharmacy back is unsure why he was told he has no refills. Nurse instructed pt to call back if he has any other issues.

## 2022-07-20 ENCOUNTER — OFFICE VISIT (OUTPATIENT)
Dept: PRIMARY CARE CLINIC | Facility: CLINIC | Age: 67
End: 2022-07-20
Payer: MEDICAID

## 2022-07-20 VITALS
DIASTOLIC BLOOD PRESSURE: 76 MMHG | HEART RATE: 64 BPM | HEIGHT: 68 IN | BODY MASS INDEX: 23.07 KG/M2 | RESPIRATION RATE: 16 BRPM | TEMPERATURE: 99 F | SYSTOLIC BLOOD PRESSURE: 128 MMHG | WEIGHT: 152.25 LBS | OXYGEN SATURATION: 98 %

## 2022-07-20 DIAGNOSIS — Z11.3 SCREEN FOR SEXUALLY TRANSMITTED DISEASES: ICD-10-CM

## 2022-07-20 DIAGNOSIS — Z11.4 SCREENING FOR HIV (HUMAN IMMUNODEFICIENCY VIRUS): ICD-10-CM

## 2022-07-20 DIAGNOSIS — Z13.29 SCREENING FOR THYROID DISORDER: ICD-10-CM

## 2022-07-20 DIAGNOSIS — Z76.0 ENCOUNTER FOR MEDICATION REFILL: ICD-10-CM

## 2022-07-20 DIAGNOSIS — Z11.59 NEED FOR HEPATITIS C SCREENING TEST: ICD-10-CM

## 2022-07-20 DIAGNOSIS — Z00.00 PREVENTATIVE HEALTH CARE: Primary | ICD-10-CM

## 2022-07-20 DIAGNOSIS — Z13.220 SCREENING FOR LIPID DISORDERS: ICD-10-CM

## 2022-07-20 PROCEDURE — 99999 PR PBB SHADOW E&M-EST. PATIENT-LVL IV: CPT | Mod: PBBFAC,,, | Performed by: NURSE PRACTITIONER

## 2022-07-20 PROCEDURE — 1160F PR REVIEW ALL MEDS BY PRESCRIBER/CLIN PHARMACIST DOCUMENTED: ICD-10-PCS | Mod: CPTII,,, | Performed by: NURSE PRACTITIONER

## 2022-07-20 PROCEDURE — 99214 OFFICE O/P EST MOD 30 MIN: CPT | Mod: PBBFAC,PN | Performed by: NURSE PRACTITIONER

## 2022-07-20 PROCEDURE — 1160F RVW MEDS BY RX/DR IN RCRD: CPT | Mod: CPTII,,, | Performed by: NURSE PRACTITIONER

## 2022-07-20 PROCEDURE — 3008F BODY MASS INDEX DOCD: CPT | Mod: CPTII,,, | Performed by: NURSE PRACTITIONER

## 2022-07-20 PROCEDURE — 1159F PR MEDICATION LIST DOCUMENTED IN MEDICAL RECORD: ICD-10-PCS | Mod: CPTII,,, | Performed by: NURSE PRACTITIONER

## 2022-07-20 PROCEDURE — 4010F ACE/ARB THERAPY RXD/TAKEN: CPT | Mod: CPTII,,, | Performed by: NURSE PRACTITIONER

## 2022-07-20 PROCEDURE — 3078F DIAST BP <80 MM HG: CPT | Mod: CPTII,,, | Performed by: NURSE PRACTITIONER

## 2022-07-20 PROCEDURE — 99396 PR PREVENTIVE VISIT,EST,40-64: ICD-10-PCS | Mod: S$PBB,,, | Performed by: NURSE PRACTITIONER

## 2022-07-20 PROCEDURE — 99396 PREV VISIT EST AGE 40-64: CPT | Mod: S$PBB,,, | Performed by: NURSE PRACTITIONER

## 2022-07-20 PROCEDURE — 3074F PR MOST RECENT SYSTOLIC BLOOD PRESSURE < 130 MM HG: ICD-10-PCS | Mod: CPTII,,, | Performed by: NURSE PRACTITIONER

## 2022-07-20 PROCEDURE — 1159F MED LIST DOCD IN RCRD: CPT | Mod: CPTII,,, | Performed by: NURSE PRACTITIONER

## 2022-07-20 PROCEDURE — 99999 PR PBB SHADOW E&M-EST. PATIENT-LVL IV: ICD-10-PCS | Mod: PBBFAC,,, | Performed by: NURSE PRACTITIONER

## 2022-07-20 PROCEDURE — 3008F PR BODY MASS INDEX (BMI) DOCUMENTED: ICD-10-PCS | Mod: CPTII,,, | Performed by: NURSE PRACTITIONER

## 2022-07-20 PROCEDURE — 3078F PR MOST RECENT DIASTOLIC BLOOD PRESSURE < 80 MM HG: ICD-10-PCS | Mod: CPTII,,, | Performed by: NURSE PRACTITIONER

## 2022-07-20 PROCEDURE — 4010F PR ACE/ARB THEARPY RXD/TAKEN: ICD-10-PCS | Mod: CPTII,,, | Performed by: NURSE PRACTITIONER

## 2022-07-20 PROCEDURE — 3074F SYST BP LT 130 MM HG: CPT | Mod: CPTII,,, | Performed by: NURSE PRACTITIONER

## 2022-07-20 RX ORDER — TAMSULOSIN HYDROCHLORIDE 0.4 MG/1
0.4 CAPSULE ORAL DAILY
Qty: 30 CAPSULE | Refills: 1 | Status: SHIPPED | OUTPATIENT
Start: 2022-07-20 | End: 2023-01-31

## 2022-07-20 NOTE — PROGRESS NOTES
"Subjective:       Patient ID: Ari Croft is a 66 y.o. male.    Chief Complaint: Medication Refill    Mr. Ari Croft is a 66 year old male, new to me, presents to the clinic with refill request and preventative care concerns. PCP is Ham Croft. Medical and surgical history in addition to problem list reviewed as listed below.    Patient presents for refill request and preventative care concerns. No acute concerns.      Past Medical History:   Diagnosis Date    Deep vein thrombosis         History reviewed. No pertinent surgical history.     Family History   Problem Relation Age of Onset    No Known Problems Mother        Social History     Tobacco Use   Smoking Status Never Smoker   Smokeless Tobacco Never Used       Social History     Social History Narrative    Not on file       Review of patient's allergies indicates:   Allergen Reactions    Smoke no more [herbal complex no.154] Shortness Of Breath     Allergic to ant kind of smoke. Cigarettes, fire, powder, etc. Have SOB and runny nose when come in contact.        Review of Systems   Constitutional: Negative.    HENT: Negative.    Eyes: Negative.    Respiratory: Negative.    Cardiovascular: Negative.    Gastrointestinal: Negative.    Genitourinary: Negative.    Musculoskeletal: Negative.    Neurological: Negative.    Psychiatric/Behavioral: Negative.          Objective:        Vitals:    07/20/22 0820   BP: 128/76   Pulse: 64   Temp: 98.7 °F (37.1 °C)      Vitals:    07/20/22 0820 07/20/22 0828   BP: 128/76    BP Location: Right arm    Patient Position: Sitting    BP Method: Medium (Automatic)    Pulse: 64    Resp:  16   Temp: 98.7 °F (37.1 °C)    TempSrc: Oral    SpO2: 98%    Weight: 69 kg (152 lb 3.6 oz)    Height: 5' 8" (1.727 m)        Physical Exam  Constitutional:       General: He is not in acute distress.     Appearance: He is well-developed.   HENT:      Head: Normocephalic and atraumatic.      Right Ear: External ear normal.      Left Ear: " External ear normal.   Eyes:      General: No scleral icterus.     Extraocular Movements: Extraocular movements intact.      Conjunctiva/sclera: Conjunctivae normal.   Cardiovascular:      Rate and Rhythm: Normal rate and regular rhythm.      Heart sounds: Normal heart sounds. No murmur heard.    No friction rub. No gallop.   Pulmonary:      Effort: Pulmonary effort is normal. No respiratory distress.      Breath sounds: Normal breath sounds. No wheezing or rales.   Musculoskeletal:         General: No tenderness or deformity. Normal range of motion.      Cervical back: Normal range of motion.   Skin:     General: Skin is warm and dry.      Findings: No erythema or rash.   Neurological:      Mental Status: He is alert and oriented to person, place, and time.      Cranial Nerves: No cranial nerve deficit.      Motor: No abnormal muscle tone.      Gait: Gait normal.   Psychiatric:         Behavior: Behavior normal.         Assessment:       1. Preventative health care    2. Screening for lipid disorders    3. Screening for thyroid disorder    4. Need for hepatitis C screening test    5. Screening for HIV (human immunodeficiency virus)    6. Screen for sexually transmitted diseases    7. Encounter for medication refill        Plan:       Preventative health care  -     Hemoglobin A1C; Future; Expected date: 07/20/2022  -     Comprehensive Metabolic Panel; Future; Expected date: 07/20/2022  -     CBC Auto Differential; Future; Expected date: 07/20/2022  -     Urinalysis, Reflex to Urine Culture Urine, Clean Catch  -     Vitamin D; Future; Expected date: 07/20/2022    Screening for lipid disorders  -     Lipid Panel; Future; Expected date: 07/20/2022    Screening for thyroid disorder  -     TSH; Future; Expected date: 07/20/2022  -     T4, Free; Future; Expected date: 07/20/2022    Need for hepatitis C screening test  -     Hepatitis C Antibody; Future; Expected date: 07/20/2022    Screening for HIV (human  immunodeficiency virus)  -     HIV 1/2 Ag/Ab (4th Gen); Future; Expected date: 07/20/2022    Screen for sexually transmitted diseases  -     RPR; Future; Expected date: 07/20/2022    Encounter for medication refill  -     tamsulosin (FLOMAX) 0.4 mg Cap; Take 1 capsule (0.4 mg total) by mouth once daily.  Dispense: 30 capsule; Refill: 1       Medication List with Changes/Refills   Current Medications    ACETAMINOPHEN (TYLENOL) 325 MG TABLET    Take 325 mg by mouth every 6 (six) hours as needed for Pain.    APIXABAN (ELIQUIS) 2.5 MG TAB    Take 1 tablet (2.5 mg total) by mouth 2 (two) times daily. Cleve dewey    ATORVASTATIN (LIPITOR) 20 MG TABLET    Take 1 tablet daily. Attila ngay uong 1 claribel cho juan mo    AZELASTINE (ASTELIN) 137 MCG (0.1 %) NASAL SPRAY    2 sprays (274 mcg total) by Nasal route 2 (two) times daily. As needed    GABAPENTIN (NEURONTIN) 300 MG CAPSULE    Take 1 capsule (300 mg total) by mouth every evening.    IPRATROPIUM (ATROVENT) 0.06 % NASAL SPRAY    2 sprays by Nasal route 3 (three) times daily. As needed    LEVOCETIRIZINE (XYZAL) 5 MG TABLET    Take 1 tablet (5 mg total) by mouth every evening. thuoc di emerald    LOSARTAN (COZAAR) 25 MG TABLET    Take 1 tablet (25 mg total) by mouth once daily. Attila stack 1 claribel cho coulter juan   Changed and/or Refilled Medications    Modified Medication Previous Medication    TAMSULOSIN (FLOMAX) 0.4 MG CAP tamsulosin (FLOMAX) 0.4 mg Cap       Take 1 capsule (0.4 mg total) by mouth once daily.                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    Blanquita Rueda, APRN, MSN, FNP-C

## 2022-07-21 ENCOUNTER — LAB VISIT (OUTPATIENT)
Dept: PRIMARY CARE CLINIC | Facility: CLINIC | Age: 67
End: 2022-07-21
Payer: MEDICAID

## 2022-07-21 DIAGNOSIS — Z11.4 SCREENING FOR HIV (HUMAN IMMUNODEFICIENCY VIRUS): ICD-10-CM

## 2022-07-21 DIAGNOSIS — Z11.3 SCREEN FOR SEXUALLY TRANSMITTED DISEASES: ICD-10-CM

## 2022-07-21 DIAGNOSIS — Z00.00 PREVENTATIVE HEALTH CARE: ICD-10-CM

## 2022-07-21 DIAGNOSIS — Z13.29 SCREENING FOR THYROID DISORDER: ICD-10-CM

## 2022-07-21 DIAGNOSIS — Z11.59 NEED FOR HEPATITIS C SCREENING TEST: ICD-10-CM

## 2022-07-21 DIAGNOSIS — Z13.220 SCREENING FOR LIPID DISORDERS: ICD-10-CM

## 2022-07-21 LAB
25(OH)D3+25(OH)D2 SERPL-MCNC: 51 NG/ML (ref 30–96)
ALBUMIN SERPL BCP-MCNC: 4.2 G/DL (ref 3.5–5.2)
ALP SERPL-CCNC: 88 U/L (ref 55–135)
ALT SERPL W/O P-5'-P-CCNC: 37 U/L (ref 10–44)
ANION GAP SERPL CALC-SCNC: 9 MMOL/L (ref 8–16)
AST SERPL-CCNC: 26 U/L (ref 10–40)
BASOPHILS # BLD AUTO: 0.06 K/UL (ref 0–0.2)
BASOPHILS NFR BLD: 0.9 % (ref 0–1.9)
BILIRUB SERPL-MCNC: 0.4 MG/DL (ref 0.1–1)
BUN SERPL-MCNC: 15 MG/DL (ref 8–23)
CALCIUM SERPL-MCNC: 9 MG/DL (ref 8.7–10.5)
CHLORIDE SERPL-SCNC: 107 MMOL/L (ref 95–110)
CHOLEST SERPL-MCNC: 115 MG/DL (ref 120–199)
CHOLEST/HDLC SERPL: 2.4 {RATIO} (ref 2–5)
CO2 SERPL-SCNC: 27 MMOL/L (ref 23–29)
CREAT SERPL-MCNC: 1.1 MG/DL (ref 0.5–1.4)
DIFFERENTIAL METHOD: NORMAL
EOSINOPHIL # BLD AUTO: 0.2 K/UL (ref 0–0.5)
EOSINOPHIL NFR BLD: 3.2 % (ref 0–8)
ERYTHROCYTE [DISTWIDTH] IN BLOOD BY AUTOMATED COUNT: 12.5 % (ref 11.5–14.5)
EST. GFR  (AFRICAN AMERICAN): >60 ML/MIN/1.73 M^2
EST. GFR  (NON AFRICAN AMERICAN): >60 ML/MIN/1.73 M^2
ESTIMATED AVG GLUCOSE: 126 MG/DL (ref 68–131)
GLUCOSE SERPL-MCNC: 101 MG/DL (ref 70–110)
HBA1C MFR BLD: 6 % (ref 4–5.6)
HCT VFR BLD AUTO: 45.1 % (ref 40–54)
HDLC SERPL-MCNC: 47 MG/DL (ref 40–75)
HDLC SERPL: 40.9 % (ref 20–50)
HGB BLD-MCNC: 14.8 G/DL (ref 14–18)
IMM GRANULOCYTES # BLD AUTO: 0.03 K/UL (ref 0–0.04)
IMM GRANULOCYTES NFR BLD AUTO: 0.5 % (ref 0–0.5)
LDLC SERPL CALC-MCNC: 56 MG/DL (ref 63–159)
LYMPHOCYTES # BLD AUTO: 2.4 K/UL (ref 1–4.8)
LYMPHOCYTES NFR BLD: 36.8 % (ref 18–48)
MCH RBC QN AUTO: 28.7 PG (ref 27–31)
MCHC RBC AUTO-ENTMCNC: 32.8 G/DL (ref 32–36)
MCV RBC AUTO: 88 FL (ref 82–98)
MONOCYTES # BLD AUTO: 0.4 K/UL (ref 0.3–1)
MONOCYTES NFR BLD: 6.3 % (ref 4–15)
NEUTROPHILS # BLD AUTO: 3.4 K/UL (ref 1.8–7.7)
NEUTROPHILS NFR BLD: 52.3 % (ref 38–73)
NONHDLC SERPL-MCNC: 68 MG/DL
NRBC BLD-RTO: 0 /100 WBC
PLATELET # BLD AUTO: 238 K/UL (ref 150–450)
PMV BLD AUTO: 10.8 FL (ref 9.2–12.9)
POTASSIUM SERPL-SCNC: 4.5 MMOL/L (ref 3.5–5.1)
PROT SERPL-MCNC: 7.1 G/DL (ref 6–8.4)
RBC # BLD AUTO: 5.15 M/UL (ref 4.6–6.2)
SODIUM SERPL-SCNC: 143 MMOL/L (ref 136–145)
T4 FREE SERPL-MCNC: 1.07 NG/DL (ref 0.71–1.51)
TRIGL SERPL-MCNC: 60 MG/DL (ref 30–150)
TSH SERPL DL<=0.005 MIU/L-ACNC: 4.89 UIU/ML (ref 0.4–4)
WBC # BLD AUTO: 6.47 K/UL (ref 3.9–12.7)

## 2022-07-21 PROCEDURE — 82306 VITAMIN D 25 HYDROXY: CPT | Performed by: NURSE PRACTITIONER

## 2022-07-21 PROCEDURE — 85025 COMPLETE CBC W/AUTO DIFF WBC: CPT | Performed by: NURSE PRACTITIONER

## 2022-07-21 PROCEDURE — 36415 COLL VENOUS BLD VENIPUNCTURE: CPT | Mod: PBBFAC,PN

## 2022-07-21 PROCEDURE — 86592 SYPHILIS TEST NON-TREP QUAL: CPT | Performed by: NURSE PRACTITIONER

## 2022-07-21 PROCEDURE — 84439 ASSAY OF FREE THYROXINE: CPT | Performed by: NURSE PRACTITIONER

## 2022-07-21 PROCEDURE — 83036 HEMOGLOBIN GLYCOSYLATED A1C: CPT | Performed by: NURSE PRACTITIONER

## 2022-07-21 PROCEDURE — 84443 ASSAY THYROID STIM HORMONE: CPT | Performed by: NURSE PRACTITIONER

## 2022-07-21 PROCEDURE — 80053 COMPREHEN METABOLIC PANEL: CPT | Performed by: NURSE PRACTITIONER

## 2022-07-21 PROCEDURE — 80061 LIPID PANEL: CPT | Performed by: NURSE PRACTITIONER

## 2022-07-21 PROCEDURE — 86803 HEPATITIS C AB TEST: CPT | Performed by: NURSE PRACTITIONER

## 2022-07-21 PROCEDURE — 36415 COLL VENOUS BLD VENIPUNCTURE: CPT | Performed by: NURSE PRACTITIONER

## 2022-07-22 LAB
HCV AB SERPL QL IA: NEGATIVE
RPR SER QL: NORMAL

## 2022-09-28 ENCOUNTER — OFFICE VISIT (OUTPATIENT)
Dept: PRIMARY CARE CLINIC | Facility: CLINIC | Age: 67
End: 2022-09-28
Payer: MEDICAID

## 2022-09-28 VITALS
HEIGHT: 69 IN | HEART RATE: 59 BPM | SYSTOLIC BLOOD PRESSURE: 124 MMHG | RESPIRATION RATE: 18 BRPM | TEMPERATURE: 98 F | BODY MASS INDEX: 22.89 KG/M2 | WEIGHT: 154.56 LBS | OXYGEN SATURATION: 99 % | DIASTOLIC BLOOD PRESSURE: 80 MMHG

## 2022-09-28 DIAGNOSIS — H10.13 ALLERGIC CONJUNCTIVITIS OF BOTH EYES: Primary | ICD-10-CM

## 2022-09-28 PROCEDURE — 1159F MED LIST DOCD IN RCRD: CPT | Mod: CPTII,,, | Performed by: FAMILY MEDICINE

## 2022-09-28 PROCEDURE — 3008F PR BODY MASS INDEX (BMI) DOCUMENTED: ICD-10-PCS | Mod: CPTII,,, | Performed by: FAMILY MEDICINE

## 2022-09-28 PROCEDURE — 4010F PR ACE/ARB THEARPY RXD/TAKEN: ICD-10-PCS | Mod: CPTII,,, | Performed by: FAMILY MEDICINE

## 2022-09-28 PROCEDURE — 1160F PR REVIEW ALL MEDS BY PRESCRIBER/CLIN PHARMACIST DOCUMENTED: ICD-10-PCS | Mod: CPTII,,, | Performed by: FAMILY MEDICINE

## 2022-09-28 PROCEDURE — 3074F PR MOST RECENT SYSTOLIC BLOOD PRESSURE < 130 MM HG: ICD-10-PCS | Mod: CPTII,,, | Performed by: FAMILY MEDICINE

## 2022-09-28 PROCEDURE — 1160F RVW MEDS BY RX/DR IN RCRD: CPT | Mod: CPTII,,, | Performed by: FAMILY MEDICINE

## 2022-09-28 PROCEDURE — 3008F BODY MASS INDEX DOCD: CPT | Mod: CPTII,,, | Performed by: FAMILY MEDICINE

## 2022-09-28 PROCEDURE — 99213 OFFICE O/P EST LOW 20 MIN: CPT | Mod: S$PBB,,, | Performed by: FAMILY MEDICINE

## 2022-09-28 PROCEDURE — 3079F PR MOST RECENT DIASTOLIC BLOOD PRESSURE 80-89 MM HG: ICD-10-PCS | Mod: CPTII,,, | Performed by: FAMILY MEDICINE

## 2022-09-28 PROCEDURE — 99999 PR PBB SHADOW E&M-EST. PATIENT-LVL IV: ICD-10-PCS | Mod: PBBFAC,,, | Performed by: FAMILY MEDICINE

## 2022-09-28 PROCEDURE — 99213 PR OFFICE/OUTPT VISIT, EST, LEVL III, 20-29 MIN: ICD-10-PCS | Mod: S$PBB,,, | Performed by: FAMILY MEDICINE

## 2022-09-28 PROCEDURE — 3074F SYST BP LT 130 MM HG: CPT | Mod: CPTII,,, | Performed by: FAMILY MEDICINE

## 2022-09-28 PROCEDURE — 4010F ACE/ARB THERAPY RXD/TAKEN: CPT | Mod: CPTII,,, | Performed by: FAMILY MEDICINE

## 2022-09-28 PROCEDURE — 99999 PR PBB SHADOW E&M-EST. PATIENT-LVL IV: CPT | Mod: PBBFAC,,, | Performed by: FAMILY MEDICINE

## 2022-09-28 PROCEDURE — 1159F PR MEDICATION LIST DOCUMENTED IN MEDICAL RECORD: ICD-10-PCS | Mod: CPTII,,, | Performed by: FAMILY MEDICINE

## 2022-09-28 PROCEDURE — 3044F PR MOST RECENT HEMOGLOBIN A1C LEVEL <7.0%: ICD-10-PCS | Mod: CPTII,,, | Performed by: FAMILY MEDICINE

## 2022-09-28 PROCEDURE — 3044F HG A1C LEVEL LT 7.0%: CPT | Mod: CPTII,,, | Performed by: FAMILY MEDICINE

## 2022-09-28 PROCEDURE — 3079F DIAST BP 80-89 MM HG: CPT | Mod: CPTII,,, | Performed by: FAMILY MEDICINE

## 2022-09-28 PROCEDURE — 99214 OFFICE O/P EST MOD 30 MIN: CPT | Mod: PBBFAC,PN | Performed by: FAMILY MEDICINE

## 2022-09-28 RX ORDER — AZELASTINE HYDROCHLORIDE 0.5 MG/ML
1 SOLUTION/ DROPS OPHTHALMIC 2 TIMES DAILY
Qty: 4 ML | Refills: 0 | Status: SHIPPED | OUTPATIENT
Start: 2022-09-28 | End: 2023-09-28

## 2022-09-28 RX ORDER — LEVOCETIRIZINE DIHYDROCHLORIDE 5 MG/1
5 TABLET, FILM COATED ORAL NIGHTLY
Qty: 90 TABLET | Refills: 0 | Status: SHIPPED | OUTPATIENT
Start: 2022-09-28 | End: 2023-03-09

## 2022-09-28 NOTE — PROGRESS NOTES
"Subjective:       Patient ID: Ari Croft is a 66 y.o. male.    Chief Complaint: Sore Throat, Cough, and "red" eyes    HPI:  Patient is a 66-year-old male with a history of allergic rhinitis who presents would red and itchy eyes, increased tearing, mild cough only at night and sore throat over the past 2 days.  Review of Systems      Objective:      Physical Exam    Assessment:       Problem List Items Addressed This Visit    None      Plan:       ***        "

## 2022-09-28 NOTE — PROGRESS NOTES
"Subjective:       Patient ID: Ari Croft is a 66 y.o. male.    Chief Complaint: Sore Throat, Cough, and "red" eyes    HPI:   Patient is a 66-year-old male with a history of allergic rhinitis who presents would red and itchy eyes, increased tearing, mild cough only at night and sore throat over the past 2 days.  Review of Systems   Constitutional:  Negative for fatigue.   Eyes:  Positive for redness and itching. Negative for photophobia, pain, discharge and visual disturbance.   Respiratory:  Negative for shortness of breath.    Musculoskeletal:  Negative for arthralgias.       Objective:      Physical Exam  Constitutional:       Appearance: Normal appearance.   HENT:      Head: Normocephalic and atraumatic.   Eyes:      Conjunctiva/sclera:      Right eye: Right conjunctiva is injected. No exudate.     Left eye: Left conjunctiva is injected. No exudate.  Cardiovascular:      Rate and Rhythm: Normal rate and regular rhythm.   Pulmonary:      Effort: Pulmonary effort is normal.      Breath sounds: Normal breath sounds.   Musculoskeletal:      Cervical back: Neck supple.   Neurological:      Mental Status: He is alert.       Assessment:       Problem List Items Addressed This Visit    None  Visit Diagnoses       Allergic conjunctivitis of both eyes    -  Primary    Relevant Medications    levocetirizine (XYZAL) 5 MG tablet    azelastine (OPTIVAR) 0.05 % ophthalmic solution              Plan:     Ari was seen today for sore throat, cough and "red" eyes.    Diagnoses and all orders for this visit:    Allergic conjunctivitis of both eyes  -     levocetirizine (XYZAL) 5 MG tablet; Take 1 tablet (5 mg total) by mouth every evening. rikki breaux  -     azelastine (OPTIVAR) 0.05 % ophthalmic solution; Place 1 drop into both eyes 2 (two) times daily.  Also consider Flonase 2 sprays in each nostril daily if needed   Patient advised to follow it 1-2 days if she experiences eye pain, discharge, or changes in vision.          "

## 2022-10-05 DIAGNOSIS — Z12.11 COLON CANCER SCREENING: ICD-10-CM

## 2022-10-24 PROBLEM — Z00.00 PREVENTATIVE HEALTH CARE: Status: RESOLVED | Noted: 2022-07-20 | Resolved: 2022-10-24

## 2023-01-05 ENCOUNTER — PATIENT OUTREACH (OUTPATIENT)
Dept: ADMINISTRATIVE | Facility: HOSPITAL | Age: 68
End: 2023-01-05
Payer: MEDICARE

## 2023-01-05 DIAGNOSIS — Z12.11 ENCOUNTER FOR COLORECTAL CANCER SCREENING: Primary | ICD-10-CM

## 2023-01-05 DIAGNOSIS — Z12.12 ENCOUNTER FOR COLORECTAL CANCER SCREENING: Primary | ICD-10-CM

## 2023-06-12 ENCOUNTER — OFFICE VISIT (OUTPATIENT)
Dept: PRIMARY CARE CLINIC | Facility: CLINIC | Age: 68
End: 2023-06-12
Payer: MEDICARE

## 2023-06-12 VITALS
SYSTOLIC BLOOD PRESSURE: 126 MMHG | HEART RATE: 60 BPM | DIASTOLIC BLOOD PRESSURE: 77 MMHG | BODY MASS INDEX: 22.74 KG/M2 | RESPIRATION RATE: 20 BRPM | WEIGHT: 153.56 LBS | HEIGHT: 69 IN

## 2023-06-12 DIAGNOSIS — N52.1 ERECTILE DYSFUNCTION DUE TO DISEASES CLASSIFIED ELSEWHERE: ICD-10-CM

## 2023-06-12 DIAGNOSIS — Z86.718 HISTORY OF DVT (DEEP VEIN THROMBOSIS): ICD-10-CM

## 2023-06-12 DIAGNOSIS — Z79.01 LONG TERM (CURRENT) USE OF ANTICOAGULANTS: ICD-10-CM

## 2023-06-12 DIAGNOSIS — R97.20 ELEVATED PSA: ICD-10-CM

## 2023-06-12 DIAGNOSIS — R73.03 PREDIABETES: ICD-10-CM

## 2023-06-12 DIAGNOSIS — R35.1 BPH ASSOCIATED WITH NOCTURIA: ICD-10-CM

## 2023-06-12 DIAGNOSIS — I10 ESSENTIAL HYPERTENSION, BENIGN: ICD-10-CM

## 2023-06-12 DIAGNOSIS — N40.1 BPH ASSOCIATED WITH NOCTURIA: ICD-10-CM

## 2023-06-12 DIAGNOSIS — E78.00 PURE HYPERCHOLESTEROLEMIA: ICD-10-CM

## 2023-06-12 DIAGNOSIS — Z00.00 WELL ADULT EXAM: Primary | ICD-10-CM

## 2023-06-12 DIAGNOSIS — I82.5Y2: ICD-10-CM

## 2023-06-12 DIAGNOSIS — H10.13 ALLERGIC CONJUNCTIVITIS OF BOTH EYES: ICD-10-CM

## 2023-06-12 PROCEDURE — 99999 PR PBB SHADOW E&M-EST. PATIENT-LVL III: ICD-10-PCS | Mod: PBBFAC,,, | Performed by: FAMILY MEDICINE

## 2023-06-12 PROCEDURE — 99999 PR PBB SHADOW E&M-EST. PATIENT-LVL III: CPT | Mod: PBBFAC,,, | Performed by: FAMILY MEDICINE

## 2023-06-12 PROCEDURE — 99397 PR PREVENTIVE VISIT,EST,65 & OVER: ICD-10-PCS | Mod: S$PBB,GZ,, | Performed by: FAMILY MEDICINE

## 2023-06-12 PROCEDURE — 99397 PER PM REEVAL EST PAT 65+ YR: CPT | Mod: S$PBB,GZ,, | Performed by: FAMILY MEDICINE

## 2023-06-12 PROCEDURE — 99213 OFFICE O/P EST LOW 20 MIN: CPT | Mod: PBBFAC,PN | Performed by: FAMILY MEDICINE

## 2023-06-12 RX ORDER — TAMSULOSIN HYDROCHLORIDE 0.4 MG/1
CAPSULE ORAL
Qty: 90 CAPSULE | Refills: 3 | Status: SHIPPED | OUTPATIENT
Start: 2023-06-12 | End: 2024-01-31 | Stop reason: SDUPTHER

## 2023-06-12 RX ORDER — LEVOCETIRIZINE DIHYDROCHLORIDE 5 MG/1
TABLET, FILM COATED ORAL
Qty: 90 TABLET | Refills: 3 | Status: SHIPPED | OUTPATIENT
Start: 2023-06-12 | End: 2024-01-04 | Stop reason: SDUPTHER

## 2023-06-12 RX ORDER — ATORVASTATIN CALCIUM 20 MG/1
TABLET, FILM COATED ORAL
Qty: 90 TABLET | Refills: 3 | Status: SHIPPED | OUTPATIENT
Start: 2023-06-12 | End: 2024-01-31 | Stop reason: SDUPTHER

## 2023-06-12 RX ORDER — TADALAFIL 20 MG/1
20 TABLET ORAL DAILY PRN
Qty: 10 TABLET | Refills: 11 | Status: SHIPPED | OUTPATIENT
Start: 2023-06-12 | End: 2023-09-26 | Stop reason: SDUPTHER

## 2023-06-12 RX ORDER — LOSARTAN POTASSIUM 25 MG/1
25 TABLET ORAL DAILY
Qty: 90 TABLET | Refills: 3 | Status: SHIPPED | OUTPATIENT
Start: 2023-06-12 | End: 2023-11-13 | Stop reason: SDUPTHER

## 2023-06-12 NOTE — PROGRESS NOTES
Subjective     Patient ID: Ari Croft is a 67 y.o. male.    Chief Complaint: Annual Exam, Erectile Dysfunction (refil), and Medication Refill    HPI:  Patient is a 67-year-old male with a history of hypertension, hyperlipidemia, chronic DVT left lower extremity on long-term anticoagulant  here for annual exam.  Recently evaluated by urology for prostate, found to have elevated PSA 6.5.  Patient reports nocturia x 2-3, current taking tamsulosin.  He also reports erectile dysfunction and is requesting a prescription for Cialis.  Review of Systems   Respiratory: Negative.     Cardiovascular: Negative.    Genitourinary:  Positive for enuresis and erectile dysfunction.   Musculoskeletal:  Positive for leg pain.   Allergic/Immunologic: Positive for environmental allergies.   Hematological:  Does not bruise/bleed easily.   Psychiatric/Behavioral:  Positive for sleep disturbance.    All other systems reviewed and are negative.       Objective     Physical Exam  Constitutional:       Appearance: He is well-developed.   HENT:      Head: Normocephalic.      Right Ear: External ear normal.      Left Ear: External ear normal.      Nose: Nose normal.      Mouth/Throat:      Pharynx: No oropharyngeal exudate.   Eyes:      Conjunctiva/sclera: Conjunctivae normal.      Pupils: Pupils are equal, round, and reactive to light.   Neck:      Thyroid: No thyromegaly.   Cardiovascular:      Rate and Rhythm: Normal rate and regular rhythm.      Heart sounds: Normal heart sounds. No murmur heard.  Pulmonary:      Effort: Pulmonary effort is normal.      Breath sounds: Normal breath sounds. No wheezing or rales.   Abdominal:      General: Bowel sounds are normal.      Palpations: Abdomen is soft. There is no mass.      Tenderness: There is no abdominal tenderness.   Musculoskeletal:      Cervical back: Neck supple.   Lymphadenopathy:      Cervical: No cervical adenopathy.      Upper Body:      Right upper body: No supraclavicular adenopathy.       Left upper body: No supraclavicular adenopathy.   Skin:     General: Skin is warm and dry.      Findings: No rash.   Neurological:      Mental Status: He is alert and oriented to person, place, and time.          Assessment and Plan     1. Well adult exam  -     Comprehensive Metabolic Panel; Future; Expected date: 06/12/2023    2. BPH associated with nocturia  -     tamsulosin (FLOMAX) 0.4 mg Cap; Take 1 capsule (0.4 mg total) by mouth once daily. - UONG 1 CLARIBEL SPENCER ([facility] MITCHELL MARTIN)  Dispense: 90 capsule; Refill: 3    3. Elevated PSA  Patient scheduled for biopsy within the next week    4. Chronic deep vein thrombosis of proximal end of left lower extremity  Patient denies leg pain.  On chronic anticoagulation with Eliquis.    5. Prediabetes  Clinically asymptomatic  -     Hemoglobin A1C; Future; Expected date: 06/12/2023    6. Long term (current) use of anticoagulants  Denies abnormal bruising or bleeding  -     CBC Auto Differential; Future; Expected date: 06/12/2023    7. Erectile dysfunction due to diseases classified elsewhere  -     tadalafiL (CIALIS) 20 MG Tab; Take 1 tablet (20 mg total) by mouth daily as needed for Erectile Dysfunction.  Dispense: 10 tablet; Refill: 11    8. Pure hypercholesterolemia  -     Lipid Panel; Future; Expected date: 06/12/2023  -     atorvastatin (LIPITOR) 20 MG tablet; Take 1 tablet daily. Attila stack 1 claribel cho juan mo  Dispense: 90 tablet; Refill: 3    9. Allergic conjunctivitis of both eyes  -     levocetirizine (XYZAL) 5 MG tablet; UONG 1 CLARIBEL ALARCON ([facility] SO ANTONELLA-ALLERGY-NGUA)  Dispense: 90 tablet; Refill: 3    10. History of DVT (deep vein thrombosis)  -     apixaban (ELIQUIS) 2.5 mg Tab; Take 1 tablet (2.5 mg total) by mouth 2 (two) times daily. Cho devante juan dong  Dispense: 180 tablet; Refill: 3    11. Essential hypertension, benign  BP stable  -     losartan (COZAAR) 25 MG tablet; Take 1 tablet (25 mg total) by mouth once daily. Attila stack 1  claribel martinez  Dispense: 90 tablet; Refill: 3           No follow-ups on file.

## 2023-06-29 ENCOUNTER — TELEPHONE (OUTPATIENT)
Dept: PRIMARY CARE CLINIC | Facility: CLINIC | Age: 68
End: 2023-06-29
Payer: MEDICARE

## 2023-06-29 NOTE — TELEPHONE ENCOUNTER
----- Message from Rosalva Paul sent at 6/29/2023  3:09 PM CDT -----  Regarding: Med Refill  Contact: Mr. Ari Croft [phone]  Good Afternoon    Above patient is requesting a new Rx for tadalafil (CIALIS) 20 MG Tab. Patient states he lost the prescription the same day that he received it in office. Can someone please assist patient in the matter? Patient is also requesting a call back concerning his results.

## 2023-09-26 DIAGNOSIS — N52.1 ERECTILE DYSFUNCTION DUE TO DISEASES CLASSIFIED ELSEWHERE: ICD-10-CM

## 2023-09-26 NOTE — TELEPHONE ENCOUNTER
No care due was identified.  Health [facility] Embedded Care Due Messages. Reference number: [id number].   9/26/2023 1:02:07 PM CDT

## 2023-09-26 NOTE — TELEPHONE ENCOUNTER
----- Message from Tiny Nieves sent at 9/26/2023 12:39 PM CDT -----  Contact: Pt [phone]  Patient states the Pharmacy has lost his prescription and is requesting another one.    Requesting an RX refill or new RX.  Is this a refill or new RX: Refill  RX name and strength (copy/paste from chart):  tadalafiL (CIALIS) 20 MG Tab  Is this a 30 day or 90 day RX: 30  Pharmacy name and phone # (copy/paste from chart):  Patient will  when ready    [id number] - [address] AT [address] [address]  Phone: [phone] Fax: [phone]    Please call and advise.    Thank You

## 2023-09-27 RX ORDER — TADALAFIL 20 MG/1
20 TABLET ORAL DAILY PRN
Qty: 10 TABLET | Refills: 11 | Status: SHIPPED | OUTPATIENT
Start: 2023-09-27 | End: 2023-09-29 | Stop reason: SDUPTHER

## 2023-09-27 NOTE — TELEPHONE ENCOUNTER
----- Message from Quynh Espinoza MA sent at 9/26/2023  3:55 PM CDT -----  Contact: Pt [phone]    ----- Message -----  From: Dalia Tello  Sent: 9/26/2023   1:51 PM CDT  To: Rosendo RODRIGUEZ Staff    Patient is returning a phone call.  Who left a message for the patient: Not sure   Does patient know what this is regarding:  Yes medication refill   Would you like a call back, or a response through your [facility] portal?:   call  Comments:  Pt states he lost script needs another one

## 2023-09-27 NOTE — TELEPHONE ENCOUNTER
Spoke with Pt, he is looking for a new Cialis rx, I told him we hadn't written him one since June, and he would need an OV. He requested a virtual with Dr. Robbins. Appt scheduled.

## 2023-09-27 NOTE — TELEPHONE ENCOUNTER
----- Message from Quynh Espinoza MA sent at 9/26/2023  3:55 PM CDT -----  Contact: Pt [phone]    ----- Message -----  From: Dalia Tello  Sent: 9/26/2023   1:51 PM CDT  To: Rosendo RODRIGUEZ Staff    Patient is returning a phone call.  Who left a message for the patient: Not sure   Does patient know what this is regarding:  Yes medication refill   Would you like a call back, or a response through your [facility] portal?:   call  Comments:  Pt states he lost script needs another one

## 2023-10-11 DIAGNOSIS — N52.1 ERECTILE DYSFUNCTION DUE TO DISEASES CLASSIFIED ELSEWHERE: ICD-10-CM

## 2023-10-11 RX ORDER — TADALAFIL 20 MG/1
20 TABLET ORAL DAILY PRN
Qty: 10 TABLET | Refills: 11 | Status: SHIPPED | OUTPATIENT
Start: 2023-10-11 | End: 2024-10-10

## 2023-11-13 DIAGNOSIS — I10 ESSENTIAL HYPERTENSION, BENIGN: ICD-10-CM

## 2023-11-13 NOTE — TELEPHONE ENCOUNTER
----- Message from Eliane Green sent at 11/13/2023  1:09 PM CST -----  Contact: Patient, [phone]  Calling because the pharmacy told him he had no more refills on Rx osartan (COZAAR) 25 MG tablet. Please advise. Thanks.          [facility] [address] [facility] [address]  [address] [facility] [address]  [facility] [address]  Phone: [phone] Fax: [phone]

## 2023-11-13 NOTE — TELEPHONE ENCOUNTER
No care due was identified.  [facility] Embedded Care Due Messages. Reference number: [id number].   11/13/2023 3:39:51 PM CST

## 2023-11-15 RX ORDER — LOSARTAN POTASSIUM 25 MG/1
25 TABLET ORAL DAILY
Qty: 90 TABLET | Refills: 1 | Status: SHIPPED | OUTPATIENT
Start: 2023-11-15 | End: 2024-01-31 | Stop reason: SDUPTHER

## 2024-01-04 DIAGNOSIS — H10.13 ALLERGIC CONJUNCTIVITIS OF BOTH EYES: ICD-10-CM

## 2024-01-04 RX ORDER — LEVOCETIRIZINE DIHYDROCHLORIDE 5 MG/1
TABLET, FILM COATED ORAL
Qty: 90 TABLET | Refills: 3 | Status: SHIPPED | OUTPATIENT
Start: 2024-01-04 | End: 2024-01-31 | Stop reason: SDUPTHER

## 2024-01-04 NOTE — TELEPHONE ENCOUNTER
Spoke with patient, he will call and transfer Losartan from [facility] to [facility] pharmacy. He said [facility] told him the Xyzal has no refills remaining. I will send Dr. Stewart a med request since Dr. Croft is out of town.

## 2024-01-04 NOTE — TELEPHONE ENCOUNTER
Attempted to call patient, no answer, no voicemail. Rx was sent to [facility] on 11/15/23 90 day supply with refills.

## 2024-01-04 NOTE — TELEPHONE ENCOUNTER
----- Message from Rakel Anderson sent at 1/4/2024 10:45 AM CST -----  Contact: Mobile  [phone]  Patient is returning a phone call.  Who left a message for the patient: Leeanne Ricardo MA  Does patient know what this is regarding:  A message from Leeanne Ricardo MA  Would you like a call back, or a response through your [facility] portal?:   Call

## 2024-01-04 NOTE — TELEPHONE ENCOUNTER
No care due was identified.  Health [facility] Embedded Care Due Messages. Reference number: [id number].   1/04/2024 11:58:27 AM CST

## 2024-01-04 NOTE — TELEPHONE ENCOUNTER
----- Message from Dalia Tello sent at 1/3/2024 12:25 PM CST -----  Contact: pt  [phone]  Requesting an RX refill or new RX.  Is this a refill or new RX: RefillRequesting an RX refill or new RX.  Is this a refill or new RX:   RX name and strength (copy/paste from chart): losartan (COZAAR) 25 MG tablet   Is this a 30 day or 90 day RX:   Pharmacy name and phone # (copy/paste from chart):  [facility] - [place] [address]   Phone: [phone]  Fax: [phone]        The doctors have asked that we provide their patients with the following 2 reminders -- prescription refills can take up to 72 hours, and a friendly reminder that in the future you can use your [facility] account to request refills: yes  RX name and strength (copy/paste from chart):  levocetirizine (XYZAL) 5 MG tablet  Is this a 30 day or 90 day RX:   Pharmacy name and phone # (copy/paste from chart):    The doctors have asked that we provide their patients with the following 2 reminders -- prescription refills can take up to 72 hours, and a friendly reminder that in the future you can use your [facility] account to request refills: yes

## 2024-01-31 ENCOUNTER — OFFICE VISIT (OUTPATIENT)
Dept: PRIMARY CARE CLINIC | Facility: CLINIC | Age: 69
End: 2024-01-31
Payer: MEDICARE

## 2024-01-31 VITALS
WEIGHT: 157.19 LBS | OXYGEN SATURATION: 98 % | HEIGHT: 69 IN | HEART RATE: 67 BPM | DIASTOLIC BLOOD PRESSURE: 80 MMHG | BODY MASS INDEX: 23.28 KG/M2 | SYSTOLIC BLOOD PRESSURE: 122 MMHG

## 2024-01-31 DIAGNOSIS — I10 ESSENTIAL HYPERTENSION, BENIGN: ICD-10-CM

## 2024-01-31 DIAGNOSIS — H10.13 ALLERGIC CONJUNCTIVITIS OF BOTH EYES: ICD-10-CM

## 2024-01-31 DIAGNOSIS — E78.00 PURE HYPERCHOLESTEROLEMIA: ICD-10-CM

## 2024-01-31 DIAGNOSIS — R35.1 BPH ASSOCIATED WITH NOCTURIA: ICD-10-CM

## 2024-01-31 DIAGNOSIS — N40.1 BPH ASSOCIATED WITH NOCTURIA: ICD-10-CM

## 2024-01-31 DIAGNOSIS — Z86.718 HISTORY OF DVT (DEEP VEIN THROMBOSIS): ICD-10-CM

## 2024-01-31 DIAGNOSIS — B34.9 VIRAL ILLNESS: ICD-10-CM

## 2024-01-31 DIAGNOSIS — I82.5Y2: ICD-10-CM

## 2024-01-31 DIAGNOSIS — Z23 NEED FOR PNEUMOCOCCAL VACCINATION: Primary | ICD-10-CM

## 2024-01-31 LAB
CTP QC/QA: YES
FLUAV AG NPH QL: NEGATIVE
FLUBV AG NPH QL: NEGATIVE

## 2024-01-31 PROCEDURE — 99999PBSHW PNEUMOCOCCAL CONJUGATE VACCINE 20-VALENT: Mod: PBBFAC,,,

## 2024-01-31 PROCEDURE — 99213 OFFICE O/P EST LOW 20 MIN: CPT | Mod: PBBFAC,PN | Performed by: FAMILY MEDICINE

## 2024-01-31 PROCEDURE — 99999 PR PBB SHADOW E&M-EST. PATIENT-LVL III: CPT | Mod: PBBFAC,,, | Performed by: FAMILY MEDICINE

## 2024-01-31 PROCEDURE — 99214 OFFICE O/P EST MOD 30 MIN: CPT | Mod: S$PBB,,, | Performed by: FAMILY MEDICINE

## 2024-01-31 PROCEDURE — 99999PBSHW POCT INFLUENZA A/B: Mod: 59,PBBFAC,,

## 2024-01-31 PROCEDURE — 87502 INFLUENZA DNA AMP PROBE: CPT | Mod: PBBFAC,PN | Performed by: FAMILY MEDICINE

## 2024-01-31 PROCEDURE — 90677 PCV20 VACCINE IM: CPT | Mod: PBBFAC,PN

## 2024-01-31 RX ORDER — TAMSULOSIN HYDROCHLORIDE 0.4 MG/1
CAPSULE ORAL
Qty: 90 CAPSULE | Refills: 1 | Status: SHIPPED | OUTPATIENT
Start: 2024-01-31

## 2024-01-31 RX ORDER — LOSARTAN POTASSIUM 25 MG/1
25 TABLET ORAL DAILY
Qty: 90 TABLET | Refills: 1 | Status: SHIPPED | OUTPATIENT
Start: 2024-01-31

## 2024-01-31 RX ORDER — ATORVASTATIN CALCIUM 20 MG/1
TABLET, FILM COATED ORAL
Qty: 90 TABLET | Refills: 1 | Status: SHIPPED | OUTPATIENT
Start: 2024-01-31

## 2024-01-31 RX ORDER — LEVOCETIRIZINE DIHYDROCHLORIDE 5 MG/1
TABLET, FILM COATED ORAL
Qty: 90 TABLET | Refills: 1 | Status: SHIPPED | OUTPATIENT
Start: 2024-01-31

## 2024-01-31 NOTE — PROGRESS NOTES
[facility]   Primary Care Visit    DATE   01/31/2024 11:25 AM    REASON FOR VISIT LISTED IN EPIC   Cough    Last Primary Care Visit: Visit date not found    HISTORY OF PRESENTING ILLNESS   Ari Croft, 68 y.o., presents today due to cough. He has a past medical history of Deep vein thrombosis.    Today, the patient reports 2 day history of cough, sore throat, and body aches. Denies fever. Reports headaches that only occur when he coughs. Patient has tried taking robitussin to alleviate his cough, which has not been effective yet. Began taking medication last night and so he is unsure if it has made a difference but reports continued coughing since this morning. Coughing is present at both day and night. Cough produces minimal mucous and is predominantly a dry cough.      Current Medications:  Current Outpatient Medications   Medication Sig    acetaminophen (TYLENOL) 325 MG tablet Take 325 mg by mouth every 6 (six) hours as needed for Pain.    apixaban (ELIQUIS) 2.5 mg Tab Take 1 tablet (2.5 mg total) by mouth 2 (two) times daily. Cleve dewey    atorvastatin (LIPITOR) 20 MG tablet Take 1 tablet daily. Attila spencer uong 1 claribel cho juan mo    azelastine (OPTIVAR) 0.05 % ophthalmic solution Place 1 drop into both eyes 2 (two) times daily.    gabapentin (NEURONTIN) 300 MG capsule Take 1 capsule (300 mg total) by mouth every evening.    ipratropium (ATROVENT) 0.06 % nasal spray 2 sprays by Nasal route 3 (three) times daily. As needed    levocetirizine (XYZAL) 5 MG tablet UONG 1 CLARIBEL SPENCER [place] ([facility] SO ANTONELLA-ALLERGY-NGUA)    losartan (COZAAR) 25 MG tablet Take 1 tablet (25 mg total) by mouth once daily. Attila spencer uong 1 claribel cho coulter juan    tadalafiL (CIALIS) 20 MG Tab Take 1 tablet (20 mg total) by mouth daily as needed (ED).    tamsulosin (FLOMAX) 0.4 mg Cap Take 1 capsule (0.4 mg total) by mouth once daily. - UONG 1 CLARIBEL SPENCER ([facility] STEPHEN SALAZAR)   Last reviewed on 1/31/2024  "11:54 AM by Tabitha Robbins MD    Allergies:  Review of patient's allergies indicates:   Allergen Reactions    Smoke no more [herbal complex no.154] Shortness Of Breath     Allergic to ant kind of smoke. Cigarettes, fire, powder, etc. Have SOB and runny nose when come in contact.       SUBJECTIVE   Review of Systems:  Review of Systems   Constitutional:  Negative for fatigue and fever.   HENT:  Positive for sneezing and sore throat. Negative for congestion, ear pain, rhinorrhea, sinus pressure, sinus pain and trouble swallowing.    Eyes:  Negative for discharge and itching.   Respiratory:  Positive for cough. Negative for chest tightness, shortness of breath and wheezing.    Cardiovascular:  Negative for chest pain, palpitations and leg swelling.   Gastrointestinal:  Negative for abdominal distention, abdominal pain, blood in stool, constipation, diarrhea, nausea and vomiting.   Genitourinary:  Negative for difficulty urinating, dysuria and hematuria.   Musculoskeletal:  Negative for arthralgias and myalgias.   Neurological:  Positive for headaches (happens only with coughing). Negative for dizziness.        OBJECTIVE   Vital Signs:  Vitals:    01/31/24 1040   BP: 122/80   BP Location: Left arm   Patient Position: Sitting   BP Method: Medium (Manual)   Pulse: 67   SpO2: 98%   Weight: 71.3 kg (157 lb 3 oz)   Height: 5' 9" (1.753 m)    Body mass index is 23.21 kg/m².    Previous Weight:  Wt Readings from Last 3 Encounters:   01/31/24 71.3 kg (157 lb 3 oz)   06/12/23 69.7 kg (153 lb 8.8 oz)   09/28/22 70.1 kg (154 lb 8.7 oz)        Physical Exam:  Physical Exam  Constitutional:       Appearance: Normal appearance.   HENT:      Head: Normocephalic and atraumatic.      Nose: Nose normal.   Eyes:      General: No scleral icterus.        Right eye: No discharge.         Left eye: No discharge.   Cardiovascular:      Rate and Rhythm: Normal rate and regular rhythm.      Pulses: Normal pulses.      Heart sounds: Normal " heart sounds.   Pulmonary:      Effort: Pulmonary effort is normal.      Breath sounds: Normal breath sounds.   Abdominal:      General: There is no distension.      Tenderness: There is no abdominal tenderness. There is no guarding or rebound.   Musculoskeletal:      Cervical back: Normal range of motion. No rigidity or tenderness.   Skin:     Capillary Refill: Capillary refill takes less than 2 seconds.   Neurological:      General: No focal deficit present.      Mental Status: He is alert and oriented to person, place, and time.         Laboratory Results:  Lab Results   Component Value Date/Time    HGB 15.4 06/12/2023 09:08 AM    HCT 48.2 06/12/2023 09:08 AM    WBC 5.87 06/12/2023 09:08 AM     06/12/2023 09:08 AM    GLU 93 06/12/2023 09:08 AM    TSH 4.889 (H) 07/21/2022 08:29 AM    CHOL 139 06/12/2023 09:08 AM    HDL 43 06/12/2023 09:08 AM    TRIG 72 06/12/2023 09:08 AM    ALT 39 06/12/2023 09:08 AM    AST 27 06/12/2023 09:08 AM    K 4.4 06/12/2023 09:08 AM     06/12/2023 09:08 AM     06/12/2023 09:08 AM    BUN 14 06/12/2023 09:08 AM        PAST MEDICAL HISTORY and FAMILY HISTORY   Past medical, surgical, and family history: Reviewed and updated in EMR.     He has a past medical history of Deep vein thrombosis. He has no past surgical history on file. His family history includes No Known Problems in his mother.    SOCIAL HISTORY   Reviewed and updated social history in EMR.    He reports that he has never smoked. He has never used smokeless tobacco. He reports current alcohol use. He reports that he does not use drugs.     HEALTH MAINTENANCE PLANNING     Health Maintenance         Date Due Completion Date    RSV Vaccine (Age 60+ and Pregnant patients) (1 - 1-dose 60+ series) Never done ---    Shingles Vaccine (2 of 2) 07/02/2018 5/7/2018    COVID-19 Vaccine (7 - 2023-24 season) 09/01/2023 7/21/2022    High Dose Statin 06/12/2024 1/31/2024    Hemoglobin A1c (Prediabetes) 06/12/2024 6/12/2023     TETANUS VACCINE 10/01/2024 10/1/2014    Colorectal Cancer Screening 04/08/2026 4/8/2023    Lipid Panel 06/12/2028 6/12/2023            Diabetes Screening:  Hemoglobin A1C   Date Value Ref Range Status   06/12/2023 5.5 4.0 - 5.6 % Final     Comment:     ADA Screening Guidelines:  5.7-6.4%  Consistent with prediabetes  >or=6.5%  Consistent with diabetes    High levels of fetal hemoglobin interfere with the HbA1C  assay. Heterozygous hemoglobin variants (HbS, HgC, etc)do  not significantly interfere with this assay.   However, presence of multiple variants may affect accuracy.     07/21/2022 6.0 (H) 4.0 - 5.6 % Final     Comment:     ADA Screening Guidelines:  5.7-6.4%  Consistent with prediabetes  >or=6.5%  Consistent with diabetes    High levels of fetal hemoglobin interfere with the HbA1C  assay. Heterozygous hemoglobin variants (HbS, HgC, etc)do  not significantly interfere with this assay.   However, presence of multiple variants may affect accuracy.          Cardiac Risk Screening:  The 10-year ASCVD risk score (Jonathan DK, et al., 2019) is: 12.6%    Values used to calculate the score:      Age: 68 years      Sex: Male      Is Non- : No      Diabetic: No      Tobacco smoker: No      Systolic Blood Pressure: 122 mmHg      Is BP treated: Yes      HDL Cholesterol: 43 mg/dL      Total Cholesterol: 139 mg/dL    ASSESSMENT and PLAN     Ari Croft should follow up in the future on an as-needed basis, or at least annually for a wellness visit.  Need for pneumococcal vaccination  -     (In Office Administered) Pneumococcal Conjugate Vaccine (20 Valent) (IM) (Preferred)    Chronic deep vein thrombosis of proximal end of left lower extremity  Patient chronic anticoagulation for chronic DVT involving the left lower extremity.  Patient clinically asymptomatic at this time.    BPH associated with nocturia  -     History of benign prostatic hyperplasia. Currently clinically asymptomatic, reports no  issues with urination. Continue flomax 0.4mg once daily.   - tamsulosin (FLOMAX) 0.4 mg Cap; Take 1 capsule (0.4 mg total) by mouth once daily. - UONG 1 CLARIBEL SPENCER ([facility]CHELLMASON FLOR[facility]U)  Dispense: 90 capsule; Refill: 1    History of DVT (deep vein thrombosis)  -     Patient with past medical history of deep vein thrombosis. Currently asymptomatic for DVT signs. Continuing apixaban 2.5mg twice daily.   - apixaban (ELIQUIS) 2.5 mg Tab; Take 1 tablet (2.5 mg total) by mouth 2 (two) times daily. Cho devante juan dong  Dispense: 180 tablet; Refill: 1    Pure hypercholesterolemia  -    Past medical history of hypercholesterolemia. Reports no adverse effects from atorvastatin. Continue atorvastatin 20mg once daily.    - atorvastatin (LIPITOR) 20 MG tablet; Take 1 tablet daily. Attila spencer uong 1 claribel cho juan mo  Dispense: 90 tablet; Refill: 1    Essential hypertension, benign  -    history of essential hypertension. Reports taking blood pressure medication regularly. Continue losartan 25mg once daily.    - losartan (COZAAR) 25 MG tablet; Take 1 tablet (25 mg total) by mouth once daily. Attila spencer uong 1 claribel cho coulter juan  Dispense: 90 tablet; Refill: 1    Allergic conjunctivitis of both eyes  -     History of allergies, managed with xyzal. Continue taking as needed.   - levocetirizine (XYZAL) 5 MG tablet; UONG 1 CLARIBEL ALARCON ([facility] SO ANTONELLA-ALLERGY-NGUA)  Dispense: 90 tablet; Refill: 1    Viral illness  Patient with a mild viral illness over the past 2 days with symptoms of cough predominantly at night.  Continue Robitussin DM or Mucinex DM as needed for cough.  Patient is to follow-up if his symptoms becomes worse.  Tested negative for influenza.  He did not want to be tested for COVID.    -     POCT Influenza A/B       The above asssessment and plan was discussed with Dr. Robbins.    --  Na Alvarado   Medical Student

## 2024-04-03 ENCOUNTER — PATIENT MESSAGE (OUTPATIENT)
Dept: ADMINISTRATIVE | Facility: HOSPITAL | Age: 69
End: 2024-04-03
Payer: MEDICARE

## 2024-06-20 ENCOUNTER — TELEPHONE (OUTPATIENT)
Dept: PRIMARY CARE CLINIC | Facility: CLINIC | Age: 69
End: 2024-06-20

## 2024-06-24 ENCOUNTER — TELEPHONE (OUTPATIENT)
Dept: PRIMARY CARE CLINIC | Facility: CLINIC | Age: 69
End: 2024-06-24
Payer: MEDICARE

## 2024-06-25 ENCOUNTER — OFFICE VISIT (OUTPATIENT)
Dept: PRIMARY CARE CLINIC | Facility: CLINIC | Age: 69
End: 2024-06-25
Payer: MEDICARE

## 2024-06-25 VITALS
HEIGHT: 67 IN | HEART RATE: 59 BPM | SYSTOLIC BLOOD PRESSURE: 140 MMHG | WEIGHT: 156.19 LBS | BODY MASS INDEX: 24.52 KG/M2 | DIASTOLIC BLOOD PRESSURE: 77 MMHG | OXYGEN SATURATION: 98 %

## 2024-06-25 DIAGNOSIS — I10 ESSENTIAL HYPERTENSION, BENIGN: ICD-10-CM

## 2024-06-25 DIAGNOSIS — R35.1 BPH ASSOCIATED WITH NOCTURIA: ICD-10-CM

## 2024-06-25 DIAGNOSIS — Z86.718 HISTORY OF DVT (DEEP VEIN THROMBOSIS): ICD-10-CM

## 2024-06-25 DIAGNOSIS — Z12.5 PROSTATE CANCER SCREENING: ICD-10-CM

## 2024-06-25 DIAGNOSIS — Z00.00 ANNUAL PHYSICAL EXAM: Primary | ICD-10-CM

## 2024-06-25 DIAGNOSIS — E78.00 PURE HYPERCHOLESTEROLEMIA: ICD-10-CM

## 2024-06-25 DIAGNOSIS — R79.9 ABNORMAL FINDING OF BLOOD CHEMISTRY, UNSPECIFIED: ICD-10-CM

## 2024-06-25 DIAGNOSIS — H10.13 ALLERGIC CONJUNCTIVITIS OF BOTH EYES: ICD-10-CM

## 2024-06-25 DIAGNOSIS — K11.1 PAROTID GLAND ENLARGEMENT: ICD-10-CM

## 2024-06-25 DIAGNOSIS — N40.1 BPH ASSOCIATED WITH NOCTURIA: ICD-10-CM

## 2024-06-25 PROCEDURE — 99397 PER PM REEVAL EST PAT 65+ YR: CPT | Mod: S$PBB,GZ,, | Performed by: FAMILY MEDICINE

## 2024-06-25 PROCEDURE — 99213 OFFICE O/P EST LOW 20 MIN: CPT | Mod: PBBFAC,PN | Performed by: FAMILY MEDICINE

## 2024-06-25 PROCEDURE — 99999 PR PBB SHADOW E&M-EST. PATIENT-LVL III: CPT | Mod: PBBFAC,,, | Performed by: FAMILY MEDICINE

## 2024-06-25 RX ORDER — LOSARTAN POTASSIUM 25 MG/1
25 TABLET ORAL DAILY
Qty: 90 TABLET | Refills: 3 | Status: SHIPPED | OUTPATIENT
Start: 2024-06-25

## 2024-06-25 RX ORDER — LEVOCETIRIZINE DIHYDROCHLORIDE 5 MG/1
TABLET, FILM COATED ORAL
Qty: 90 TABLET | Refills: 3 | Status: SHIPPED | OUTPATIENT
Start: 2024-06-25

## 2024-06-25 RX ORDER — ATORVASTATIN CALCIUM 20 MG/1
TABLET, FILM COATED ORAL
Qty: 90 TABLET | Refills: 3 | Status: SHIPPED | OUTPATIENT
Start: 2024-06-25

## 2024-06-25 RX ORDER — AZITHROMYCIN 500 MG/1
1000 TABLET, FILM COATED ORAL DAILY
Qty: 4 TABLET | Refills: 0 | Status: SHIPPED | OUTPATIENT
Start: 2024-06-25

## 2024-06-25 RX ORDER — TAMSULOSIN HYDROCHLORIDE 0.4 MG/1
CAPSULE ORAL
Qty: 90 CAPSULE | Refills: 3 | Status: SHIPPED | OUTPATIENT
Start: 2024-06-25

## 2024-06-25 NOTE — PROGRESS NOTES
Clinic Note  6/26/2024      Subjective:       Patient ID:  Ari is a 68 y.o. male being seen for an established visit.    Chief Complaint: Annual Exam (Bump under ear )    Annual exam-patient with hypertension, hyperlipidemia, BPH, DVT on Eliquis here for annual exam.    Facial swelling-patient reports that over the last 20+ years, will have intermittent facial swelling of the left or right side at the area of the parotid gland.  Patient was seen by dog in the past and was told that there was nothing to worry about, could be a blocked salivary duct.  Last episode was last week, symptoms have since resolved.  No pain, erythema, tenderness, unintentional weight loss      Review of Systems   Constitutional:  Negative for chills, fever, malaise/fatigue and weight loss.   HENT:  Negative for congestion, sinus pain and sore throat.    Respiratory:  Negative for cough, shortness of breath and wheezing.    Cardiovascular:  Negative for chest pain and palpitations.   Gastrointestinal:  Negative for constipation, diarrhea, nausea and vomiting.   Genitourinary:  Negative for dysuria, frequency and urgency.   Musculoskeletal:  Negative for myalgias.   Skin:  Negative for rash.   Neurological:  Negative for headaches.       Medication List with Changes/Refills   New Medications    AZITHROMYCIN (ZITHROMAX) 500 MG TABLET    Take 2 tablets (1,000 mg total) by mouth once daily. seda Pugh 2 claribel   Current Medications    AZELASTINE (OPTIVAR) 0.05 % OPHTHALMIC SOLUTION    Place 1 drop into both eyes 2 (two) times daily.    TADALAFIL (CIALIS) 20 MG TAB    Take 1 tablet (20 mg total) by mouth daily as needed (ED).   Changed and/or Refilled Medications    Modified Medication Previous Medication    APIXABAN (ELIQUIS) 2.5 MG TAB apixaban (ELIQUIS) 2.5 mg Tab       Take 1 tablet (2.5 mg total) by mouth 2 (two) times daily. Cho devante juan dong    Take 1 tablet (2.5 mg total) by mouth 2 (two) times daily. Cho devante juan dong     "ATORVASTATIN (LIPITOR) 20 MG TABLET atorvastatin (LIPITOR) 20 MG tablet       Take 1 tablet daily. Akin stack 1 claribel cho juan mo    Take 1 tablet daily. Akin stack 1 claribel cho juan mo    LEVOCETIRIZINE (XYZAL) 5 MG TABLET levocetirizine (XYZAL) 5 MG tablet       UONG 1 CLARIBEL AKIN NGAY BAN CHIEU ([facility] SO ANTONELLA-ALLERGY-NGUA)    UMASON 1 CLARIBEL AKIN NGAY BAN CHIEU ([facility] SO ANTONELLA-ALLERGY-NGUA)    LOSARTAN (COZAAR) 25 MG TABLET losartan (COZAAR) 25 MG tablet       Take 1 tablet (25 mg total) by mouth once daily. Akin stack 1 claribel cho coulter juan    Take 1 tablet (25 mg total) by mouth once daily. Akin stack 1 claribel cho coulter juan    TAMSULOSIN (FLOMAX) 0.4 MG CAP tamsulosin (FLOMAX) 0.4 mg Cap       Take 1 capsule (0.4 mg total) by mouth once daily. - BENOIT 1 CLARIBEL SPENCER ([facility])    Take 1 capsule (0.4 mg total) by mouth once daily. - UMASON 1 CLARIBEL SPENCER ([facility])   Discontinued Medications    ACETAMINOPHEN (TYLENOL) 325 MG TABLET    Take 325 mg by mouth every 6 (six) hours as needed for Pain.    GABAPENTIN (NEURONTIN) 300 MG CAPSULE    Take 1 capsule (300 mg total) by mouth every evening.    IPRATROPIUM (ATROVENT) 0.06 % NASAL SPRAY    2 sprays by Nasal route 3 (three) times daily. As needed       Patient Active Problem List   Diagnosis    Transaminitis    History of DVT (deep vein thrombosis)    Long term (current) use of anticoagulants    Occult blood in stools    Adenomyoma, gallbladder    Cholelithiasis    Post-phlebitic syndrome    Chronic deep vein thrombosis of proximal end of left lower extremity           Objective:      BP (!) 140/77   Pulse (!) 59   Ht 5' 7" (1.702 m)   Wt 70.8 kg (156 lb 3.1 oz)   SpO2 98%   BMI 24.46 kg/m²   Estimated body mass index is 24.46 kg/m² as calculated from the following:    Height as of this encounter: 5' 7" (1.702 m).    Weight as of this encounter: 70.8 kg (156 lb 3.1 oz).  Physical Exam  Vitals reviewed.   Constitutional:       General: He is not in acute " distress.     Appearance: He is not diaphoretic.   HENT:      Head: Normocephalic and atraumatic.     Eyes:      Conjunctiva/sclera: Conjunctivae normal.   Cardiovascular:      Rate and Rhythm: Normal rate and regular rhythm.      Heart sounds: Normal heart sounds.   Pulmonary:      Effort: Pulmonary effort is normal. No respiratory distress.      Breath sounds: Normal breath sounds. No wheezing.   Abdominal:      General: Bowel sounds are normal.      Palpations: Abdomen is soft.   Musculoskeletal:         General: Normal range of motion.      Cervical back: Normal range of motion.   Skin:     General: Skin is warm and dry.      Findings: No erythema or rash.   Neurological:      Mental Status: He is alert and oriented to person, place, and time.   Psychiatric:         Mood and Affect: Mood and affect normal.         Behavior: Behavior normal.         Thought Content: Thought content normal.         Judgment: Judgment normal.           Assessment and Plan:     1. Annual physical exam  - CBC Auto Differential; Future  - Hemoglobin A1C; Future    2. BPH associated with nocturia  - tamsulosin (FLOMAX) 0.4 mg Cap; Take 1 capsule (0.4 mg total) by mouth once daily. - UONG 1 CLARIBEL CHAUDHARIDINORA ([facility])  Dispense: 90 capsule; Refill: 3  - CBC Auto Differential; Future  - Hemoglobin A1C; Future  - PSA, Screening; Future    3. Essential hypertension, benign  - blood pressure not controlled, patient come back in 2 weeks for nursing blood pressure check  - losartan (COZAAR) 25 MG tablet; Take 1 tablet (25 mg total) by mouth once daily. Attila chaudharidinora uong 1 claribel cho coulter juan  Dispense: 90 tablet; Refill: 3  - CBC Auto Differential; Future  - Comprehensive Metabolic Panel; Future  - Hemoglobin A1C; Future    4. Pure hypercholesterolemia  - atorvastatin (LIPITOR) 20 MG tablet; Take 1 tablet daily. Attila pedraza uong 1 claribel cho juan mo  Dispense: 90 tablet; Refill: 3  - CBC Auto Differential; Future  - Lipid Panel; Future  - Hemoglobin  A1C; Future    5. Allergic conjunctivitis of both eyes  - levocetirizine (XYZAL) 5 MG tablet; UONG 1 CHIRAG ALARCON ([facility] SO ANTONELLA-ALLERGY-NGUA)  Dispense: 90 tablet; Refill: 3  - CBC Auto Differential; Future  - Hemoglobin A1C; Future    6. History of DVT (deep vein thrombosis)  - apixaban (ELIQUIS) 2.5 mg Tab; Take 1 tablet (2.5 mg total) by mouth 2 (two) times daily. Cleve dewey  Dispense: 180 tablet; Refill: 2  - CBC Auto Differential; Future  - Hemoglobin A1C; Future    7. Prostate cancer screening  - PSA, Screening; Future    8. Abnormal finding of blood chemistry, unspecified  - Hemoglobin A1C; Future    9. Parotid gland enlargement  - discussed with patient that there could be component of salivary duct blockage during symptoms, otherwise no red flags on history or exam.      Follow Up:   Follow up in about 2 years (around 6/25/2026) for 2 wk nursing BP visit.    Other Orders Placed This Visit:  Orders Placed This Encounter   Procedures    CBC Auto Differential    Comprehensive Metabolic Panel    Lipid Panel    Hemoglobin A1C    PSA, Screening         Cj Croft MD        This note is dictated on M*Modal word recognition program.  There are word recognition mistakes that are occasionally missed on review.

## 2024-07-09 ENCOUNTER — CLINICAL SUPPORT (OUTPATIENT)
Dept: PRIMARY CARE CLINIC | Facility: CLINIC | Age: 69
End: 2024-07-09
Payer: MEDICARE

## 2024-07-09 ENCOUNTER — PATIENT MESSAGE (OUTPATIENT)
Dept: ADMINISTRATIVE | Facility: HOSPITAL | Age: 69
End: 2024-07-09
Payer: MEDICARE

## 2024-07-09 VITALS — HEART RATE: 78 BPM | DIASTOLIC BLOOD PRESSURE: 82 MMHG | SYSTOLIC BLOOD PRESSURE: 138 MMHG | OXYGEN SATURATION: 98 %

## 2024-07-09 DIAGNOSIS — I10 ESSENTIAL HYPERTENSION, BENIGN: Primary | ICD-10-CM

## 2024-07-09 PROCEDURE — 99999 PR PBB SHADOW E&M-EST. PATIENT-LVL I: CPT | Mod: PBBFAC,,,

## 2024-07-09 PROCEDURE — 99211 OFF/OP EST MAY X REQ PHY/QHP: CPT | Mod: PBBFAC,PN

## 2024-07-09 NOTE — PROGRESS NOTES
Ari Croft 68 y.o. male is here today for Blood Pressure check.   History of HTN yes.    Review of patient's allergies indicates:   Allergen Reactions    Smoke no more [herbal complex no.154] Shortness Of Breath     Allergic to ant kind of smoke. Cigarettes, fire, powder, etc. Have SOB and runny nose when come in contact.     Creatinine   Date Value Ref Range Status   06/12/2023 1.0 0.5 - 1.4 mg/dL Final     Sodium   Date Value Ref Range Status   06/12/2023 142 136 - 145 mmol/L Final     Potassium   Date Value Ref Range Status   06/12/2023 4.4 3.5 - 5.1 mmol/L Final   ]  Patient verifies taking blood pressure medications on a regular basis at the same time of the day.     Current Outpatient Medications:     apixaban (ELIQUIS) 2.5 mg Tab, Take 1 tablet (2.5 mg total) by mouth 2 (two) times daily. Cleve devante anthonymarc dewey, Disp: 180 tablet, Rfl: 2    atorvastatin (LIPITOR) 20 MG tablet, Take 1 tablet daily. Akin chaudharichristen uong 1 claribel cho juan mo, Disp: 90 tablet, Rfl: 3    azelastine (OPTIVAR) 0.05 % ophthalmic solution, Place 1 drop into both eyes 2 (two) times daily., Disp: 4 mL, Rfl: 0    azithromycin (ZITHROMAX) 500 MG tablet, Take 2 tablets (1,000 mg total) by mouth once daily. Jodi guido, uong 2 claribel, Disp: 4 tablet, Rfl: 0    levocetirizine (XYZAL) 5 MG tablet, UONG 1 CLARIBEL AKIN SPENCER BAN CHIEU ([facility] SO ANTONELLA-ALLERGY-UA), Disp: 90 tablet, Rfl: 3    losartan (COZAAR) 25 MG tablet, Take 1 tablet (25 mg total) by mouth once daily. Akin spencer uong 1 claribel cho coulter juan, Disp: 90 tablet, Rfl: 3    tadalafiL (CIALIS) 20 MG Tab, Take 1 tablet (20 mg total) by mouth daily as needed (ED). (Patient not taking: Reported on 6/25/2024), Disp: 10 tablet, Rfl: 11    tamsulosin (FLOMAX) 0.4 mg Cap, Take 1 capsule (0.4 mg total) by mouth once daily. - UONG 1 CLARIBEL SPENCER (THUOC MITCHELL MARTIN), Disp: 90 capsule, Rfl: 3  Does patient have record of home blood pressure readings no. .   Last dose of blood pressure medication was taken at 11am  yesterday.  Patient is asymptomatic.   Complains of none.    Vitals:    07/09/24 0843   BP: 138/82   Pulse: 78   SpO2: 98%         Dr. Croft informed of nurse visit.

## 2025-05-05 ENCOUNTER — LAB VISIT (OUTPATIENT)
Dept: PRIMARY CARE CLINIC | Facility: CLINIC | Age: 70
End: 2025-05-05
Payer: MEDICARE

## 2025-05-05 ENCOUNTER — OFFICE VISIT (OUTPATIENT)
Dept: PRIMARY CARE CLINIC | Facility: CLINIC | Age: 70
End: 2025-05-05
Payer: MEDICARE

## 2025-05-05 DIAGNOSIS — I10 ESSENTIAL HYPERTENSION, BENIGN: ICD-10-CM

## 2025-05-05 DIAGNOSIS — E78.00 PURE HYPERCHOLESTEROLEMIA: ICD-10-CM

## 2025-05-05 DIAGNOSIS — R73.03 PREDIABETES: ICD-10-CM

## 2025-05-05 DIAGNOSIS — Z00.00 ANNUAL PHYSICAL EXAM: ICD-10-CM

## 2025-05-05 DIAGNOSIS — Z12.5 PROSTATE CANCER SCREENING: ICD-10-CM

## 2025-05-05 DIAGNOSIS — R79.89 OTHER SPECIFIED ABNORMAL FINDINGS OF BLOOD CHEMISTRY: ICD-10-CM

## 2025-05-05 DIAGNOSIS — I82.5Y2: ICD-10-CM

## 2025-05-05 DIAGNOSIS — Z86.718 HISTORY OF DVT (DEEP VEIN THROMBOSIS): ICD-10-CM

## 2025-05-05 DIAGNOSIS — R00.2 PALPITATIONS: Primary | ICD-10-CM

## 2025-05-05 DIAGNOSIS — R42 DIZZINESS: ICD-10-CM

## 2025-05-05 DIAGNOSIS — R00.2 PALPITATIONS: ICD-10-CM

## 2025-05-05 LAB
ABSOLUTE EOSINOPHIL (OHS): 0.19 K/UL
ABSOLUTE MONOCYTE (OHS): 0.51 K/UL (ref 0.3–1)
ABSOLUTE NEUTROPHIL COUNT (OHS): 4.18 K/UL (ref 1.8–7.7)
ALBUMIN SERPL BCP-MCNC: 4 G/DL (ref 3.5–5.2)
ALP SERPL-CCNC: 96 UNIT/L (ref 40–150)
ALT SERPL W/O P-5'-P-CCNC: 56 UNIT/L (ref 10–44)
ANION GAP (OHS): 9 MMOL/L (ref 8–16)
AST SERPL-CCNC: 39 UNIT/L (ref 11–45)
BASOPHILS # BLD AUTO: 0.06 K/UL
BASOPHILS NFR BLD AUTO: 0.8 %
BILIRUB SERPL-MCNC: 0.8 MG/DL (ref 0.1–1)
BUN SERPL-MCNC: 15 MG/DL (ref 8–23)
CALCIUM SERPL-MCNC: 8.9 MG/DL (ref 8.7–10.5)
CHLORIDE SERPL-SCNC: 102 MMOL/L (ref 95–110)
CHOLEST SERPL-MCNC: 135 MG/DL (ref 120–199)
CHOLEST/HDLC SERPL: 2.9 {RATIO} (ref 2–5)
CO2 SERPL-SCNC: 27 MMOL/L (ref 23–29)
CREAT SERPL-MCNC: 0.9 MG/DL (ref 0.5–1.4)
EAG (OHS): 126 MG/DL (ref 68–131)
ERYTHROCYTE [DISTWIDTH] IN BLOOD BY AUTOMATED COUNT: 12.4 % (ref 11.5–14.5)
GFR SERPLBLD CREATININE-BSD FMLA CKD-EPI: >60 ML/MIN/1.73/M2
GLUCOSE SERPL-MCNC: 81 MG/DL (ref 70–110)
HBA1C MFR BLD: 6 % (ref 4–5.6)
HCT VFR BLD AUTO: 48.2 % (ref 40–54)
HDLC SERPL-MCNC: 47 MG/DL (ref 40–75)
HDLC SERPL: 34.8 % (ref 20–50)
HGB BLD-MCNC: 15.1 GM/DL (ref 14–18)
IMM GRANULOCYTES # BLD AUTO: 0.03 K/UL (ref 0–0.04)
IMM GRANULOCYTES NFR BLD AUTO: 0.4 % (ref 0–0.5)
LDLC SERPL CALC-MCNC: 68.8 MG/DL (ref 63–159)
LYMPHOCYTES # BLD AUTO: 2.41 K/UL (ref 1–4.8)
MCH RBC QN AUTO: 28.5 PG (ref 27–31)
MCHC RBC AUTO-ENTMCNC: 31.3 G/DL (ref 32–36)
MCV RBC AUTO: 91 FL (ref 82–98)
NONHDLC SERPL-MCNC: 88 MG/DL
NUCLEATED RBC (/100WBC) (OHS): 0 /100 WBC
OHS QRS DURATION: 86 MS
OHS QTC CALCULATION: 418 MS
PLATELET # BLD AUTO: 272 K/UL (ref 150–450)
PMV BLD AUTO: 10.1 FL (ref 9.2–12.9)
POTASSIUM SERPL-SCNC: 3.9 MMOL/L (ref 3.5–5.1)
PROT SERPL-MCNC: 7.5 GM/DL (ref 6–8.4)
PSA SERPL-MCNC: 9.23 NG/ML
RBC # BLD AUTO: 5.3 M/UL (ref 4.6–6.2)
RELATIVE EOSINOPHIL (OHS): 2.6 %
RELATIVE LYMPHOCYTE (OHS): 32.7 % (ref 18–48)
RELATIVE MONOCYTE (OHS): 6.9 % (ref 4–15)
RELATIVE NEUTROPHIL (OHS): 56.6 % (ref 38–73)
SODIUM SERPL-SCNC: 138 MMOL/L (ref 136–145)
TRIGL SERPL-MCNC: 96 MG/DL (ref 30–150)
WBC # BLD AUTO: 7.38 K/UL (ref 3.9–12.7)

## 2025-05-05 PROCEDURE — 93005 ELECTROCARDIOGRAM TRACING: CPT | Mod: S$GLB,,, | Performed by: FAMILY MEDICINE

## 2025-05-05 PROCEDURE — 93010 ELECTROCARDIOGRAM REPORT: CPT | Mod: S$GLB,,, | Performed by: INTERNAL MEDICINE

## 2025-05-05 PROCEDURE — 99999 PR PBB SHADOW E&M-EST. PATIENT-LVL IV: CPT | Mod: PBBFAC,,, | Performed by: FAMILY MEDICINE

## 2025-05-05 PROCEDURE — 85025 COMPLETE CBC W/AUTO DIFF WBC: CPT

## 2025-05-05 PROCEDURE — 80061 LIPID PANEL: CPT

## 2025-05-05 PROCEDURE — 84153 ASSAY OF PSA TOTAL: CPT

## 2025-05-05 PROCEDURE — 83036 HEMOGLOBIN GLYCOSYLATED A1C: CPT

## 2025-05-05 PROCEDURE — 82040 ASSAY OF SERUM ALBUMIN: CPT

## 2025-05-06 ENCOUNTER — TELEPHONE (OUTPATIENT)
Dept: PRIMARY CARE CLINIC | Facility: CLINIC | Age: 70
End: 2025-05-06

## 2025-05-06 ENCOUNTER — RESULTS FOLLOW-UP (OUTPATIENT)
Dept: PRIMARY CARE CLINIC | Facility: CLINIC | Age: 70
End: 2025-05-06
Payer: MEDICARE

## 2025-05-06 DIAGNOSIS — E78.00 PURE HYPERCHOLESTEROLEMIA: ICD-10-CM

## 2025-05-06 DIAGNOSIS — R74.8 ABNORMAL LIVER ENZYMES: ICD-10-CM

## 2025-05-06 DIAGNOSIS — R97.20 ELEVATED PSA: Primary | ICD-10-CM

## 2025-05-06 PROCEDURE — 99999 PR PBB SHADOW E&M-EST. PATIENT-LVL I: CPT | Mod: PBBFAC,,, | Performed by: FAMILY MEDICINE

## 2025-05-06 NOTE — TELEPHONE ENCOUNTER
No care due was identified.  [facility] Embedded Care Due Messages. Reference number: [id number].   5/06/2025 2:50:25 PM CDT

## 2025-05-07 VITALS
BODY MASS INDEX: 24.29 KG/M2 | HEIGHT: 67 IN | DIASTOLIC BLOOD PRESSURE: 79 MMHG | HEART RATE: 66 BPM | WEIGHT: 154.75 LBS | SYSTOLIC BLOOD PRESSURE: 150 MMHG | OXYGEN SATURATION: 95 %

## 2025-05-07 RX ORDER — ATORVASTATIN CALCIUM 20 MG/1
TABLET, FILM COATED ORAL
Qty: 90 TABLET | Refills: 3 | Status: SHIPPED | OUTPATIENT
Start: 2025-05-07

## 2025-05-07 NOTE — PROGRESS NOTES
Clinic Note  5/7/2025      Subjective:       Patient ID:  Ari is a 69 y.o. male being seen for:      History of Present Illness    CHIEF COMPLAINT:  Ari presents today with dizziness upon exertion    HISTORY OF PRESENT ILLNESS:  He experiences dizziness during physical exertion or sudden positional changes, such as bending down and standing up quickly. These symptoms have been present for several years. The dizziness is described as sudden, causing visual disturbances and requiring him to lie down. Symptoms improve with rest and water consumption, typically resolving within 1-2 minutes of sitting down, allowing him to resume activities.    SLEEP:  He reports chronic insomnia since childhood with typical sleep duration of 3-4 hours per night. Occasional use of Tylenol PM extends sleep duration to 7 hours.    GENITOURINARY:  He experiences nocturia 3-4 times per night. Current prostate medication has improved urinary symptoms overall, making urination easier.    DIET:  He reports consistently small portions with limited appetite. Diet consists of rice twice daily with meat or tofu. He exclusively consumes home-prepared meals.    LABS:  2023 labs showed normal kidney function, liver function, and lipid panel without evidence of anemia.      ROS:  General: -fever, -chills, -fatigue, -weight gain, -weight loss  Eyes: -vision changes, -redness, -discharge, +blurry vision  ENT: -ear pain, -nasal congestion, -sore throat  Cardiovascular: -chest pain, -palpitations, -lower extremity edema, +feelings of fast heart rate  Respiratory: -cough, -shortness of breath  Gastrointestinal: -abdominal pain, -nausea, -vomiting, -diarrhea, -constipation, -blood in stool, +loss of appetite, +appetite changes  Genitourinary: -dysuria, -hematuria, -frequency, +excessive urination  Musculoskeletal: -joint pain, -muscle pain  Skin: -rash, -lesion  Neurological: -headache, +dizziness, -numbness, -tingling  Psychiatric: -anxiety, -depression,  "+sleep difficulty, +sleep disturbances           Medication List with Changes/Refills   Current Medications    APIXABAN (ELIQUIS) 2.5 MG TAB    Take 1 tablet (2.5 mg total) by mouth 2 (two) times daily. Cho devante juan dong    AZELASTINE (OPTIVAR) 0.05 % OPHTHALMIC SOLUTION    Place 1 drop into both eyes 2 (two) times daily.    AZITHROMYCIN (ZITHROMAX) 500 MG TABLET    Take 2 tablets (1,000 mg total) by mouth once daily. Jodi jeter clairmarc guido, uong 2 claribel    LEVOCETIRIZINE (XYZAL) 5 MG TABLET    UONG 1 CLARIBEL SPENCER BAN CHIEU ([facility] SO ANTONELLA-ALLERGY-NGUA)    LOSARTAN (COZAAR) 25 MG TABLET    Take 1 tablet (25 mg total) by mouth once daily. Attila spencer ujamari 1 claribel cho coulter juan    TADALAFIL (CIALIS) 20 MG TAB    Take 1 tablet (20 mg total) by mouth daily as needed (ED).    TAMSULOSIN (FLOMAX) 0.4 MG CAP    Take 1 capsule (0.4 mg total) by mouth once daily. - UONG 1 CLARIBEL SPENCER ([facility] STEPHEN SALAZAR)   Changed and/or Refilled Medications    Modified Medication Previous Medication    ATORVASTATIN (LIPITOR) 20 MG TABLET atorvastatin (LIPITOR) 20 MG tablet       Take 1 tablet daily. Attila ngay uong 1 claribel cho juan mo    Take 1 tablet daily. Attila stack 1 claribel cho juan mo       Problem List[1]        Objective:      /80 (BP Location: Right arm, Patient Position: Sitting)   Pulse 64   Ht 5' 7" (1.702 m)   Wt 70.2 kg (154 lb 12.2 oz)   SpO2 95%   BMI 24.24 kg/m²       Physical Exam    General: No acute distress. Well-developed. Well-nourished.  Eyes: EOMI. Sclerae anicteric.  HENT: Normocephalic. Atraumatic. Nares patent. Moist oral mucosa.  Cardiovascular: Regular rate. Regular rhythm. No murmurs. No rubs. No gallops. Normal S1, S2.  Respiratory: Normal respiratory effort. Clear to auscultation bilaterally. No rales. No rhonchi. No wheezing.  Musculoskeletal: No  obvious deformity.  Extremities: No lower extremity edema.  Neurological: Alert & oriented x3. No slurred speech. Normal gait.  Psychiatric: Normal mood. Normal affect. " Good insight. Good judgment.  Skin: Warm. Dry. No rash.           Assessment and Plan:     Assessment & Plan    - Evaluated complaint of dizziness when exerting himself or bending over.  - Considered possible causes including low BP, dehydration, and cardiac issues.  - Noted history of prostate issues and nocturia, but deemed unlikely to be related to current symptoms.  - Ruled out sleep apnea as potential contributor to symptoms based on body habitus and lack of typical symptoms.  - Assessed sleep patterns and noted chronic insomnia may be impacting overall health.    INSOMNIA:  - Ari reports chronic insomnia since childhood, sleeping only 3-4 hours per night.  - Occasionally takes Tylenol PM which sometimes helps extend sleep to 7 hours.  - Discussed and educated patient on the importance of getting at least 7 hours of sleep per night for overall health.    BENIGN PROSTATIC HYPERPLASIA WITH LOWER URINARY TRACT SYMPTOMS:  - Ari reports urinary stream deviation and current medication is helping make urination easier.  - Discussed possibility of needing medication adjustment or surgery if symptoms worsen.    DIZZINESS:  - Ari experiences dizziness during exertion, when bending down, standing quickly, or doing heavy work.  - This may be related to cardiovascular issues.  - Ordered EKG, orthostatic vital signs were negative, and stress test to evaluate cardiac function.  - Instructed patient to sit and rest briefly when experiencing dizziness and to drink cold (not ice cold) water when feeling faint.  - Provided education on stroke and myocardial infarction symptoms to increase awareness.    NOCTURIA:  - Ari urinates 4-5 times nightly despite avoiding nighttime fluids.  - Advised to increase water intake during daytime hours while avoiding fluid intake 2-3 hours before bedtime to better manage symptoms.    FOLLOW-UP:  - Ordered labs.  - Ari to follow up when office calls to schedule stress test appointment.          Follow Up:   No follow-ups on file.    Other Orders Placed This Visit:  Orders Placed This Encounter    CBC Auto Differential    Comprehensive Metabolic Panel    Hemoglobin A1C    Lipid Panel    PSA, Screening    Exercise Stress - EKG    EKG 12-lead         Cj Croft MD        This note is dictated on M*Modal word recognition program and This note was generated with the assistance of ambient listening technology. Verbal consent was obtained by the patient and accompanying visitor(s) for the recording of patient appointment to facilitate this note. I attest to having reviewed and edited the generated note for accuracy, though some syntax or spelling errors may persist. Please contact the author of this note for any clarification.  .  There are word recognition mistakes that are occasionally missed on review.         [1]   Patient Active Problem List  Diagnosis    Transaminitis    History of DVT (deep vein thrombosis)    Long term (current) use of anticoagulants    Occult blood in stools    Adenomyoma, gallbladder    Cholelithiasis    Post-phlebitic syndrome    Chronic deep vein thrombosis of proximal end of left lower extremity

## 2025-05-09 ENCOUNTER — TELEPHONE (OUTPATIENT)
Dept: PRIMARY CARE CLINIC | Facility: CLINIC | Age: 70
End: 2025-05-09
Payer: MEDICARE

## 2025-05-28 ENCOUNTER — RESULTS FOLLOW-UP (OUTPATIENT)
Dept: PRIMARY CARE CLINIC | Facility: CLINIC | Age: 70
End: 2025-05-28

## 2025-06-02 ENCOUNTER — PATIENT MESSAGE (OUTPATIENT)
Dept: ADMINISTRATIVE | Facility: HOSPITAL | Age: 70
End: 2025-06-02
Payer: MEDICARE

## 2025-07-07 ENCOUNTER — PATIENT MESSAGE (OUTPATIENT)
Dept: ADMINISTRATIVE | Facility: HOSPITAL | Age: 70
End: 2025-07-07
Payer: MEDICARE

## 2025-07-31 DIAGNOSIS — N40.1 BPH ASSOCIATED WITH NOCTURIA: ICD-10-CM

## 2025-07-31 DIAGNOSIS — R35.1 BPH ASSOCIATED WITH NOCTURIA: ICD-10-CM

## 2025-07-31 RX ORDER — TAMSULOSIN HYDROCHLORIDE 0.4 MG/1
CAPSULE ORAL
Qty: 90 CAPSULE | Refills: 3 | Status: SHIPPED | OUTPATIENT
Start: 2025-07-31

## 2025-07-31 RX ORDER — FLUTICASONE PROPIONATE 50 MCG
SPRAY, SUSPENSION (ML) NASAL
Qty: 16 G | Refills: 0 | Status: SHIPPED | OUTPATIENT
Start: 2025-07-31

## 2025-07-31 RX ORDER — AMLODIPINE AND BENAZEPRIL HYDROCHLORIDE 5; 10 MG/1; MG/1
CAPSULE ORAL
Qty: 90 CAPSULE | Refills: 0 | OUTPATIENT
Start: 2025-07-31

## 2025-07-31 NOTE — TELEPHONE ENCOUNTER
No care due was identified.  [facility] Embedded Care Due Messages. Reference number: [id number].   7/31/2025 11:13:58 AM CDT

## 2025-07-31 NOTE — TELEPHONE ENCOUNTER
Refill Routing Note   Medication(s) are not appropriate for processing by [facility] for the following reason(s):        Required vitals abnormal  No active prescription written by provider    [facility] action(s):  Approve  Defer               Appointments  past 12m or future 3m with PCP    Date Provider   Last Visit   5/5/2025 Cj Croft MD   Next Visit   Visit date not found Cj Croft MD   ED visits in past 90 days: 0        Note composed:1:03 PM 07/31/2025

## 2025-08-22 ENCOUNTER — OFFICE VISIT (OUTPATIENT)
Dept: PRIMARY CARE CLINIC | Facility: CLINIC | Age: 70
End: 2025-08-22
Payer: MEDICARE

## 2025-08-22 VITALS
DIASTOLIC BLOOD PRESSURE: 72 MMHG | HEART RATE: 69 BPM | OXYGEN SATURATION: 95 % | HEIGHT: 67 IN | TEMPERATURE: 98 F | WEIGHT: 154.19 LBS | SYSTOLIC BLOOD PRESSURE: 129 MMHG | BODY MASS INDEX: 24.2 KG/M2

## 2025-08-22 DIAGNOSIS — H10.13 ALLERGIC CONJUNCTIVITIS OF BOTH EYES: ICD-10-CM

## 2025-08-22 DIAGNOSIS — Z86.718 HISTORY OF DVT (DEEP VEIN THROMBOSIS): ICD-10-CM

## 2025-08-22 PROCEDURE — 99999 PR PBB SHADOW E&M-EST. PATIENT-LVL III: CPT | Mod: PBBFAC,,, | Performed by: FAMILY MEDICINE

## 2025-08-22 RX ORDER — LEVOCETIRIZINE DIHYDROCHLORIDE 5 MG/1
TABLET, FILM COATED ORAL
Qty: 90 TABLET | Refills: 3 | Status: SHIPPED | OUTPATIENT
Start: 2025-08-22

## 2025-08-22 RX ORDER — PROMETHAZINE HYDROCHLORIDE AND DEXTROMETHORPHAN HYDROBROMIDE 6.25; 15 MG/5ML; MG/5ML
5 SYRUP ORAL EVERY 4 HOURS PRN
Qty: 118 ML | Refills: 3 | Status: SHIPPED | OUTPATIENT
Start: 2025-08-22

## 2025-08-25 ENCOUNTER — E-CONSULT (OUTPATIENT)
Dept: HEMATOLOGY/ONCOLOGY | Facility: CLINIC | Age: 70
End: 2025-08-25
Payer: MEDICARE

## 2025-08-25 DIAGNOSIS — Z86.718 HISTORY OF DVT (DEEP VEIN THROMBOSIS): Primary | ICD-10-CM

## 2025-08-25 PROCEDURE — 99451 NTRPROF PH1/NTRNET/EHR 5/>: CPT | Mod: S$GLB,,, | Performed by: INTERNAL MEDICINE

## 2025-08-29 ENCOUNTER — TELEPHONE (OUTPATIENT)
Dept: PRIMARY CARE CLINIC | Facility: CLINIC | Age: 70
End: 2025-08-29
Payer: MEDICARE